# Patient Record
Sex: FEMALE | Race: WHITE | NOT HISPANIC OR LATINO | Employment: OTHER | URBAN - METROPOLITAN AREA
[De-identification: names, ages, dates, MRNs, and addresses within clinical notes are randomized per-mention and may not be internally consistent; named-entity substitution may affect disease eponyms.]

---

## 2017-05-07 ENCOUNTER — HOSPITAL ENCOUNTER (EMERGENCY)
Facility: HOSPITAL | Age: 82
Discharge: HOME | End: 2017-05-07
Attending: EMERGENCY MEDICINE
Payer: MEDICARE

## 2017-05-07 VITALS
WEIGHT: 144 LBS | HEIGHT: 60 IN | HEART RATE: 66 BPM | OXYGEN SATURATION: 95 % | TEMPERATURE: 98 F | BODY MASS INDEX: 28.27 KG/M2 | DIASTOLIC BLOOD PRESSURE: 58 MMHG | RESPIRATION RATE: 15 BRPM | SYSTOLIC BLOOD PRESSURE: 135 MMHG

## 2017-05-07 DIAGNOSIS — R10.9 ABDOMINAL PAIN: ICD-10-CM

## 2017-05-07 LAB
ALBUMIN SERPL BCP-MCNC: 3.8 G/DL
ALP SERPL-CCNC: 95 U/L
ALT SERPL W/O P-5'-P-CCNC: 18 U/L
ANION GAP SERPL CALC-SCNC: 12 MMOL/L
AST SERPL-CCNC: 20 U/L
BASOPHILS # BLD AUTO: 0.01 K/UL
BASOPHILS NFR BLD: 0.3 %
BILIRUB SERPL-MCNC: 0.3 MG/DL
BILIRUB UR QL STRIP: NEGATIVE
BUN SERPL-MCNC: 34 MG/DL
CALCIUM SERPL-MCNC: 9.8 MG/DL
CHLORIDE SERPL-SCNC: 106 MMOL/L
CLARITY UR: CLEAR
CO2 SERPL-SCNC: 22 MMOL/L
COLOR UR: YELLOW
CREAT SERPL-MCNC: 1.7 MG/DL
DIFFERENTIAL METHOD: ABNORMAL
EOSINOPHIL # BLD AUTO: 0 K/UL
EOSINOPHIL NFR BLD: 1.1 %
ERYTHROCYTE [DISTWIDTH] IN BLOOD BY AUTOMATED COUNT: 13.5 %
EST. GFR  (AFRICAN AMERICAN): 33 ML/MIN/1.73 M^2
EST. GFR  (NON AFRICAN AMERICAN): 28 ML/MIN/1.73 M^2
GLUCOSE SERPL-MCNC: 116 MG/DL
GLUCOSE UR QL STRIP: NEGATIVE
HCT VFR BLD AUTO: 33.7 %
HGB BLD-MCNC: 11.1 G/DL
HGB UR QL STRIP: NEGATIVE
KETONES UR QL STRIP: NEGATIVE
LEUKOCYTE ESTERASE UR QL STRIP: NEGATIVE
LYMPHOCYTES # BLD AUTO: 0.9 K/UL
LYMPHOCYTES NFR BLD: 25.4 %
MCH RBC QN AUTO: 28.4 PG
MCHC RBC AUTO-ENTMCNC: 32.9 %
MCV RBC AUTO: 86 FL
MONOCYTES # BLD AUTO: 0.3 K/UL
MONOCYTES NFR BLD: 7.9 %
NEUTROPHILS # BLD AUTO: 2.4 K/UL
NEUTROPHILS NFR BLD: 65.3 %
NITRITE UR QL STRIP: NEGATIVE
PH UR STRIP: 5 [PH] (ref 5–8)
PLATELET # BLD AUTO: 251 K/UL
PMV BLD AUTO: 9.9 FL
POTASSIUM SERPL-SCNC: 5.3 MMOL/L
PROT SERPL-MCNC: 7.1 G/DL
PROT UR QL STRIP: NEGATIVE
RBC # BLD AUTO: 3.91 M/UL
SODIUM SERPL-SCNC: 140 MMOL/L
SP GR UR STRIP: 1.02 (ref 1–1.03)
TROPONIN I SERPL DL<=0.01 NG/ML-MCNC: 0.01 NG/ML
URN SPEC COLLECT METH UR: NORMAL
UROBILINOGEN UR STRIP-ACNC: NEGATIVE EU/DL
WBC # BLD AUTO: 3.66 K/UL

## 2017-05-07 PROCEDURE — 84484 ASSAY OF TROPONIN QUANT: CPT

## 2017-05-07 PROCEDURE — 81003 URINALYSIS AUTO W/O SCOPE: CPT

## 2017-05-07 PROCEDURE — 96361 HYDRATE IV INFUSION ADD-ON: CPT

## 2017-05-07 PROCEDURE — 93005 ELECTROCARDIOGRAM TRACING: CPT

## 2017-05-07 PROCEDURE — 96375 TX/PRO/DX INJ NEW DRUG ADDON: CPT

## 2017-05-07 PROCEDURE — 85025 COMPLETE CBC W/AUTO DIFF WBC: CPT

## 2017-05-07 PROCEDURE — 93010 ELECTROCARDIOGRAM REPORT: CPT | Mod: ,,, | Performed by: INTERNAL MEDICINE

## 2017-05-07 PROCEDURE — 25000003 PHARM REV CODE 250: Performed by: EMERGENCY MEDICINE

## 2017-05-07 PROCEDURE — 80053 COMPREHEN METABOLIC PANEL: CPT

## 2017-05-07 PROCEDURE — 96374 THER/PROPH/DIAG INJ IV PUSH: CPT

## 2017-05-07 PROCEDURE — 63600175 PHARM REV CODE 636 W HCPCS: Performed by: EMERGENCY MEDICINE

## 2017-05-07 PROCEDURE — 99284 EMERGENCY DEPT VISIT MOD MDM: CPT | Mod: 25

## 2017-05-07 RX ORDER — MORPHINE SULFATE 2 MG/ML
2 INJECTION, SOLUTION INTRAMUSCULAR; INTRAVENOUS
Status: COMPLETED | OUTPATIENT
Start: 2017-05-07 | End: 2017-05-07

## 2017-05-07 RX ORDER — ONDANSETRON 2 MG/ML
4 INJECTION INTRAMUSCULAR; INTRAVENOUS ONCE
Status: COMPLETED | OUTPATIENT
Start: 2017-05-07 | End: 2017-05-07

## 2017-05-07 RX ORDER — HYDROCODONE BITARTRATE AND ACETAMINOPHEN 5; 325 MG/1; MG/1
1 TABLET ORAL EVERY 4 HOURS PRN
Qty: 12 TABLET | Refills: 0 | Status: SHIPPED | OUTPATIENT
Start: 2017-05-07 | End: 2017-05-17

## 2017-05-07 RX ORDER — ONDANSETRON 4 MG/1
4 TABLET, FILM COATED ORAL EVERY 8 HOURS PRN
Qty: 12 TABLET | Refills: 0 | Status: SHIPPED | OUTPATIENT
Start: 2017-05-07 | End: 2019-08-05

## 2017-05-07 RX ORDER — SODIUM CHLORIDE 9 MG/ML
INJECTION, SOLUTION INTRAVENOUS CONTINUOUS
Status: DISCONTINUED | OUTPATIENT
Start: 2017-05-07 | End: 2017-05-07 | Stop reason: HOSPADM

## 2017-05-07 RX ADMIN — SODIUM CHLORIDE: 0.9 INJECTION, SOLUTION INTRAVENOUS at 11:05

## 2017-05-07 RX ADMIN — ONDANSETRON 4 MG: 2 INJECTION INTRAMUSCULAR; INTRAVENOUS at 11:05

## 2017-05-07 RX ADMIN — MORPHINE SULFATE 2 MG: 2 INJECTION, SOLUTION INTRAMUSCULAR; INTRAVENOUS at 11:05

## 2017-05-07 NOTE — ED AVS SNAPSHOT
[facility] [address] [place] [address]               Lali Floyd   2017 10:40 AM   ED    Description:  Female : 1935   Department:  [facility]           Your Care was Coordinated By:     Provider Role From To    Jessie Correa MD Attending Provider 17 1052 --      Reason for Visit     Flank Pain           Diagnoses this Visit        Comments    Abdominal pain           ED Disposition     ED Disposition Condition Comment    Discharge             To Do List           Follow-up Information     Follow up with Blayne Santos MD In 2 days.    Specialty:  Internal Medicine    Contact information:    [address] [facility] [id number] [phone]          Follow up with [facility].    Specialty:  Emergency Medicine    Why:  As needed, If symptoms worsen    Contact information:    [address] [place] [id number] [phone]       These Medications        Disp Refills Start End    hydrocodone-acetaminophen 5-325mg (NORCO) 5-325 mg per tablet 12 tablet 0 2017    Take 1 tablet by mouth every 4 (four) hours as needed for Pain. - Oral    ondansetron (ZOFRAN) 4 MG tablet 12 tablet 0 2017     Take 1 tablet (4 mg total) by mouth every 8 (eight) hours as needed for Nausea. - Oral      [facility]     [facility] Care [facility] - 24/ Assistance  Unless otherwise directed by your provider, please contact [facility], our nurse care line that is available for / assistance.     Registered nurses in the [facility] provide: appointment scheduling, clinical advisement, health education, and other advisory services.  Call: [phone] (toll free)               Medications           Message regarding Medications     Verify the changes and/or additions to your medication regime listed below are the same as discussed with your clinician  today.  If any of these changes or additions are incorrect, please notify your healthcare provider.        START taking these NEW medications        Refills    hydrocodone-acetaminophen 5-325mg (NORCO) 5-325 mg per tablet 0    Sig: Take 1 tablet by mouth every 4 (four) hours as needed for Pain.    Class: Print    Route: Oral    ondansetron (ZOFRAN) 4 MG tablet 0    Sig: Take 1 tablet (4 mg total) by mouth every 8 (eight) hours as needed for Nausea.    Class: Print    Route: Oral      These medications were administered today        Dose Freq    0.9%  NaCl infusion  Continuous    Sig: Inject into the vein continuous.    Class: Normal    Route: Intravenous    morphine injection 2 mg 2 mg ED 1 Time    Sig: Inject 1 mL (2 mg total) into the vein ED 1 Time.    Class: Normal    Route: Intravenous    ondansetron injection 4 mg 4 mg Once    Sig: Inject 4 mg into the vein once.    Class: Normal    Route: Intravenous           Verify that the below list of medications is an accurate representation of the medications you are currently taking.  If none reported, the list may be blank. If incorrect, please contact your healthcare provider. Carry this list with you in case of emergency.           Current Medications     0.9%  NaCl infusion Inject into the vein continuous.    hydrocodone-acetaminophen 5-325mg (NORCO) 5-325 mg per tablet Take 1 tablet by mouth every 4 (four) hours as needed for Pain.    ondansetron (ZOFRAN) 4 MG tablet Take 1 tablet (4 mg total) by mouth every 8 (eight) hours as needed for Nausea.           Clinical Reference Information           Your Vitals Were     BP Pulse Temp Resp Height Weight    125/50 64 97.8 °F (36.6 °C) (Oral) 14 5' (1.524 m) 65.3 kg (144 lb)    SpO2 BMI             97% 28.12 kg/m2         Allergies as of 5/7/2017        Reactions    Codeine       Immunizations Administered on Date of Encounter - 5/7/2017     None      ED Micro, Lab, POCT     Start Ordered       Status Ordering Provider     05/07/17 1201 05/07/17 1200  Troponin I  Add-on      Completed     05/07/17 1104 05/07/17 1103  CBC auto differential  Once      Final result     05/07/17 1104 05/07/17 1103  Comprehensive metabolic panel  Once      Final result     05/07/17 1104 05/07/17 1103  Urinalysis  Once      Final result     05/07/17 1103 05/07/17 1103  Troponin I  Once      Final result       ED Imaging Orders     Start Ordered       Status Ordering Provider    05/07/17 1104 05/07/17 1103  CT Renal Stone Study ABD Pelvis WO  1 time imaging      Final result         Discharge Instructions         Abdominal Pain    Abdominal pain is pain in the stomach or belly area. Everyone has this pain from time to time. In many cases it goes away on its own. But abdominal pain can sometimes be due to a serious problem, such as appendicitis. So its important to know when to seek help.  Causes of abdominal pain  There are many possible causes of abdominal pain. Common causes in adults include:  · Constipation, diarrhea, or gas  · Stomach acid flowing back up into the esophagus (acid reflux or heartburn)  · Severe acid reflux, called GERD (gastroesophageal reflux disease)  · A sore in the lining of the stomach or small intestine (peptic ulcer)  · Inflammation of the gallbladder, liver, or pancreas  · Gallstones or kidney stones  · Appendicitis   · Intestinal blockage   · An internal organ pushing through a muscle or other tissue (hernia)  · Urinary tract infections  · In women, menstrual cramps, fibroids, or endometriosis  · Inflammation or infection of the intestines  Diagnosing the cause of abdominal pain  Your healthcare provider will do a physical exam help find the cause of your pain. If needed, tests will be ordered. Belly pain has many possible causes. So it can be hard to find the reason for your pain. Giving details about your pain can help. Tell your provider where and when you feel the pain, and what makes it better or worse. Also let your  provider know if you have other symptoms such as:  · Fever  · Tiredness  · Upset stomach (nausea)  · Vomiting  · Changes in bathroom habits  Treating abdominal pain  Some causes of pain need emergency medical treatment right away. These include appendicitis or a bowel blockage. Other problems can be treated with rest, fluids, or medicines. Your healthcare provider can give you specific instructions for treatment or self-care based on what is causing your pain.  If you have vomiting or diarrhea, sip water or other clear fluids. When you are ready to eat solid foods again, start with small amounts of easy-to-digest, low-fat foods. These include apple sauce, toast, or crackers.   When to seek medical care  Call 911 or go to the hospital right away if you:  · Cant pass stool and are vomiting  · Are vomiting blood or have bloody diarrhea or black, tarry diarrhea  · Have chest, neck, or shoulder pain  · Feel like you might pass out  · Have pain in your shoulder blades with nausea  · Have sudden, severe belly pain  · Have new, severe pain unlike any you have felt before  · Have a belly that is rigid, hard, and tender to touch  Call your healthcare provider if you have:  · Pain for more than 5 days  · Bloating for more than 2 days  · Diarrhea for more than 5 days  · A fever of 100.4°F (38.0°C) or higher, or as directed by your provider  · Pain that gets worse  · Weight loss for no reason  · Continued lack of appetite  · Blood in your stool  How to prevent abdominal pain  Here are some tips to help prevent abdominal pain:  · Eat smaller amounts of food at one time.  · Avoid greasy, fried, or other high-fat foods.  · Avoid foods that give you gas.  · Exercise regularly.  · Drink plenty of fluids.  To help prevent GERD symptoms:  · Quit smoking.  · Reduce alcohol and certain foods that increase stomach acid.  · Avoid aspirin and over-the-counter pain and fever medicines (NSAIDS or nonsteroidal anti-inflammatory drugs), if  possible  · Lose extra weight.  · Finish eating at least 2 hours before you go to bed or lie down.  · Raise the head of your bed.  Date Last Reviewed: 7/1/2016  © 8482-7073 [employer]. [address] [place] [address], [place] [address]. All rights reserved. This information is not intended as a substitute for professional medical care. Always follow your healthcare professional's instructions.          [facility] Sign-Up     Activating your [facility] account is as easy as 1-2-3!     1) Visit [email or web address], select Sign Up Now, enter this activation code and your date of birth, then select Next.  [email or web address]  Expires: 6/21/2017  1:09 PM      2) Create a username and password to use when you visit [facility] in the future and select a security question in case you lose your password and select Next.    3) Enter your e-mail address and click Sign Up!    Additional Information  If you have questions, please e-mail [email or web address] or call [phone] to talk to our [facility] staff. Remember, [facility] is NOT to be used for urgent needs. For medical emergencies, dial 911.         Smoking Cessation     If you would like to quit smoking:   You may be eligible for free services if you are a [place] resident and started smoking cigarettes before September 1, 1988.  Call the Smoking Cessation [employer] ([facility]) toll free at [phone] [phone] or [phone] 476-5022.   Call -101-QUIT-NOW if you do not meet the above criteria.   Contact us via email: [email or web address]   View our website for more information: [email or web address]         [facility] complies with applicable Federal civil rights laws and does not discriminate on the basis of race, color, national origin, age, disability, or sex.        Language Assistance Services     ATTENTION: Language assistance services are available, free of charge. Please call [phone].      ATENCIÓN: Si lexiila español, tiene a carnes disposición  servicios gratuitos de asistencia lingüística. Jono al [phone].     CECI Ý: N?u b?n nói Ti?ng Vi?t, có các d?ch v? h? tr? ngôn ng? mi?n phí dành cho b?n. G?i s? [phone].

## 2017-05-07 NOTE — DISCHARGE INSTRUCTIONS
Abdominal Pain    Abdominal pain is pain in the stomach or belly area. Everyone has this pain from time to time. In many cases it goes away on its own. But abdominal pain can sometimes be due to a serious problem, such as appendicitis. So its important to know when to seek help.  Causes of abdominal pain  There are many possible causes of abdominal pain. Common causes in adults include:  · Constipation, diarrhea, or gas  · Stomach acid flowing back up into the esophagus (acid reflux or heartburn)  · Severe acid reflux, called GERD (gastroesophageal reflux disease)  · A sore in the lining of the stomach or small intestine (peptic ulcer)  · Inflammation of the gallbladder, liver, or pancreas  · Gallstones or kidney stones  · Appendicitis   · Intestinal blockage   · An internal organ pushing through a muscle or other tissue (hernia)  · Urinary tract infections  · In women, menstrual cramps, fibroids, or endometriosis  · Inflammation or infection of the intestines  Diagnosing the cause of abdominal pain  Your healthcare provider will do a physical exam help find the cause of your pain. If needed, tests will be ordered. Belly pain has many possible causes. So it can be hard to find the reason for your pain. Giving details about your pain can help. Tell your provider where and when you feel the pain, and what makes it better or worse. Also let your provider know if you have other symptoms such as:  · Fever  · Tiredness  · Upset stomach (nausea)  · Vomiting  · Changes in bathroom habits  Treating abdominal pain  Some causes of pain need emergency medical treatment right away. These include appendicitis or a bowel blockage. Other problems can be treated with rest, fluids, or medicines. Your healthcare provider can give you specific instructions for treatment or self-care based on what is causing your pain.  If you have vomiting or diarrhea, sip water or other clear fluids. When you are ready to eat solid foods again,  start with small amounts of easy-to-digest, low-fat foods. These include apple sauce, toast, or crackers.   When to seek medical care  Call 911 or go to the hospital right away if you:  · Cant pass stool and are vomiting  · Are vomiting blood or have bloody diarrhea or black, tarry diarrhea  · Have chest, neck, or shoulder pain  · Feel like you might pass out  · Have pain in your shoulder blades with nausea  · Have sudden, severe belly pain  · Have new, severe pain unlike any you have felt before  · Have a belly that is rigid, hard, and tender to touch  Call your healthcare provider if you have:  · Pain for more than 5 days  · Bloating for more than 2 days  · Diarrhea for more than 5 days  · A fever of 100.4°F (38.0°C) or higher, or as directed by your provider  · Pain that gets worse  · Weight loss for no reason  · Continued lack of appetite  · Blood in your stool  How to prevent abdominal pain  Here are some tips to help prevent abdominal pain:  · Eat smaller amounts of food at one time.  · Avoid greasy, fried, or other high-fat foods.  · Avoid foods that give you gas.  · Exercise regularly.  · Drink plenty of fluids.  To help prevent GERD symptoms:  · Quit smoking.  · Reduce alcohol and certain foods that increase stomach acid.  · Avoid aspirin and over-the-counter pain and fever medicines (NSAIDS or nonsteroidal anti-inflammatory drugs), if possible  · Lose extra weight.  · Finish eating at least 2 hours before you go to bed or lie down.  · Raise the head of your bed.  Date Last Reviewed: 7/1/2016  © 5514-6664 [employer]. [address] [place] [address], [place] [address]. All rights reserved. This information is not intended as a substitute for professional medical care. Always follow your healthcare professional's instructions.

## 2017-05-07 NOTE — ED PROVIDER NOTES
SCRIBE #1 NOTE: I, Reagan Hughes, am scribing for, and in the presence of, Jessie Correa MD. I have scribed the entire note.      History      Chief Complaint   Patient presents with    Flank Pain     pt is having right flank        Review of patient's allergies indicates:   Allergen Reactions    Codeine         HPI   HPI    5/7/2017, 11:13 AM   History obtained from the patient      History of Present Illness: Lali Floyd is a 81 y.o. female patient who presents to the Emergency Department for R flank pain which onset gradually one week ago, suddenly worsening this AM. Symptoms have been constant this AM and moderate in severity. Sx are exacerbated by nothing and relieved with 600 mg of motrin. No other sxs reported. Pt states she may have strained her back lifting up on objects since she is moving a lot of furniture. Pt also reports slipping and falling in her shower one week ago. Patient denies any fever, N/V/D, chills, constipation, dysuria, difficulty urinating, hematuria, CP, SOB, radiating pain and all other sxs at this time. No further complaints or concerns at this time.     Arrival mode: Personal vehicle      PCP: Blayne Santos MD     Past Medical History:  Past medical history reviewed not relevant      Past Surgical History:  Past surgical history reviewed not relevant      Family History:  Family history reviewed not relevant      Social History:  Social History    Social History Main Topics    Social History Main Topics    Smoking status: Unknown if ever smoked    Smokeless tobacco: Unknown if ever used    Alcohol Use: Unknown drinking history    Drug Use: Unknown if ever used    Sexual Activity: Unknown         ROS   Review of Systems   Constitutional: Negative for chills and fever.   HENT: Negative for congestion and sore throat.    Respiratory: Negative for chest tightness and shortness of breath.    Cardiovascular: Negative for chest pain.   Gastrointestinal: Negative for abdominal  pain, nausea and vomiting.   Genitourinary: Positive for flank pain (R). Negative for decreased urine volume, difficulty urinating, dysuria, hematuria and urgency.   Musculoskeletal: Negative for back pain and neck pain.   Skin: Negative for rash.   Neurological: Negative for dizziness, numbness and headaches.   Psychiatric/Behavioral: Negative for agitation and confusion.   All other systems reviewed and are negative.      Physical Exam    Initial Vitals   BP Pulse Resp Temp SpO2   05/07/17 1046 05/07/17 1046 05/07/17 1046 05/07/17 1050 05/07/17 1046   147/67 71 18 97.8 °F (36.6 °C) 98 %      Physical Exam  Nursing Notes and Vital Signs Reviewed.  Constitutional: Patient is in no acute distress. Awake and alert. Well-developed and well-nourished.  Head: Atraumatic. Normocephalic.  Eyes: PERRL. EOM intact. Conjunctivae are not pale. No scleral icterus.  ENT: Mucous membranes are moist. Oropharynx is clear and symmetric.    Neck: Supple. Full ROM. No lymphadenopathy.  Cardiovascular: Regular rate. Regular rhythm. No murmurs, rubs, or gallops. Distal pulses are 2+ and symmetric.  Pulmonary/Chest: No respiratory distress. Clear to auscultation bilaterally. No wheezing, rales, or rhonchi.  Abdominal: Soft and non-distended.  There is RUQ tenderness.  No rebound, guarding, or rigidity. Good bowel sounds.  Genitourinary: R CVA tenderness  Musculoskeletal: Moves all extremities. No obvious deformities. No edema. No calf tenderness.  Skin: Warm and dry.  Neurological:  Alert, awake, and appropriate.  Normal speech.  No acute focal neurological deficits are appreciated.  Psychiatric: Normal affect. Good eye contact. Appropriate in content.    ED Course    Procedures  ED Vital Signs:  Vitals:    05/07/17 1046 05/07/17 1050 05/07/17 1115 05/07/17 1137   BP: (!) 147/67  (!) 140/61    Pulse: 71  64 65   Resp: 18  14 13   Temp:  97.8 °F (36.6 °C)     TempSrc:  Oral     SpO2: 98%  99% 99%   Weight: 65.3 kg (144 lb)      Height: 5'  (1.524 m)       05/07/17 1245 05/07/17 1315   BP: (!) 125/50 (!) 135/58   Pulse: 64 66   Resp: 14 15   Temp:     TempSrc:     SpO2: 97% 95%   Weight:     Height:         Abnormal Lab Results:  Labs Reviewed   CBC W/ AUTO DIFFERENTIAL - Abnormal; Notable for the following:        Result Value    WBC 3.66 (*)     RBC 3.91 (*)     Hemoglobin 11.1 (*)     Hematocrit 33.7 (*)     Lymph # 0.9 (*)     All other components within normal limits   COMPREHENSIVE METABOLIC PANEL - Abnormal; Notable for the following:     Potassium 5.3 (*)     CO2 22 (*)     Glucose 116 (*)     BUN, Bld 34 (*)     Creatinine 1.7 (*)     eGFR if  33 (*)     eGFR if non  28 (*)     All other components within normal limits   URINALYSIS   TROPONIN I   TROPONIN I        All Lab Results:  Results for orders placed or performed during the hospital encounter of 05/07/17   CBC auto differential   Result Value Ref Range    WBC 3.66 (L) 3.90 - 12.70 K/uL    RBC 3.91 (L) 4.00 - 5.40 M/uL    Hemoglobin 11.1 (L) 12.0 - 16.0 g/dL    Hematocrit 33.7 (L) 37.0 - 48.5 %    MCV 86 82 - 98 fL    MCH 28.4 27.0 - 31.0 pg    MCHC 32.9 32.0 - 36.0 %    RDW 13.5 11.5 - 14.5 %    Platelets 251 150 - 350 K/uL    MPV 9.9 9.2 - 12.9 fL    Gran # 2.4 1.8 - 7.7 K/uL    Lymph # 0.9 (L) 1.0 - 4.8 K/uL    Mono # 0.3 0.3 - 1.0 K/uL    Eos # 0.0 0.0 - 0.5 K/uL    Baso # 0.01 0.00 - 0.20 K/uL    Gran% 65.3 38.0 - 73.0 %    Lymph% 25.4 18.0 - 48.0 %    Mono% 7.9 4.0 - 15.0 %    Eosinophil% 1.1 0.0 - 8.0 %    Basophil% 0.3 0.0 - 1.9 %    Differential Method Automated    Comprehensive metabolic panel   Result Value Ref Range    Sodium 140 136 - 145 mmol/L    Potassium 5.3 (H) 3.5 - 5.1 mmol/L    Chloride 106 95 - 110 mmol/L    CO2 22 (L) 23 - 29 mmol/L    Glucose 116 (H) 70 - 110 mg/dL    BUN, Bld 34 (H) 8 - 23 mg/dL    Creatinine 1.7 (H) 0.5 - 1.4 mg/dL    Calcium 9.8 8.7 - 10.5 mg/dL    Total Protein 7.1 6.0 - 8.4 g/dL    Albumin 3.8 3.5 - 5.2 g/dL     Total Bilirubin 0.3 0.1 - 1.0 mg/dL    Alkaline Phosphatase 95 55 - 135 U/L    AST 20 10 - 40 U/L    ALT 18 10 - 44 U/L    Anion Gap 12 8 - 16 mmol/L    eGFR if African American 33 (A) >60 mL/min/1.73 m^2    eGFR if non African American 28 (A) >60 mL/min/1.73 m^2   Urinalysis   Result Value Ref Range    Specimen UA Urine, Clean Catch     Color, UA Yellow Yellow, Straw, Keke    Appearance, UA Clear Clear    pH, UA 5.0 5.0 - 8.0    Specific Gravity, UA 1.025 1.005 - 1.030    Protein, UA Negative Negative    Glucose, UA Negative Negative    Ketones, UA Negative Negative    Bilirubin (UA) Negative Negative    Occult Blood UA Negative Negative    Nitrite, UA Negative Negative    Urobilinogen, UA Negative <2.0 EU/dL    Leukocytes, UA Negative Negative   Troponin I   Result Value Ref Range    Troponin I 0.013 0.000 - 0.026 ng/mL         Imaging Results:  Imaging Results         CT Renal Stone Study ABD Pelvis WO (Final result) Result time:  05/07/17 11:50:14    Final result by Billie Carmona MD (05/07/17 11:50:14)    Impression:     There is a 0.68 cm nonobstructing stone in the lower pole of left kidney.        All CT scans at this facility use dose modulation, iterative reconstruction and/or weight based dosing when appropriate to reduce radiation dose to as low as reasonably achievable.       Electronically signed by: BILLIE CARMONA MD  Date:     05/07/17  Time:    11:50     Narrative:    CT RENAL STONE STUDY ABD PELVIS WO    Standard noncontrast CT scan of the abdomen and pelvis utilizing renal stone protocol.    History:  right flank pain     The liver, spleen, kidneys and pancreas appear grossly normal.  The gallbladder is adequately identified.  The visible bowel is grossly unremarkable.     The right kidney appears free of mass calculus or obstruction the right ureter is normal in appearance.  On the left, there is a nonobstructing stone in the lower pole which measures 0.68 cm.  A renal cyst in the upper pole  posteriorly on the left measures 1.1 cm.  The left ureter is normal in its course and configuration.  The bladder is decompressed.    Chronic degenerative change of the spine is present.               The EKG was ordered, reviewed, and independently interpreted by the ED provider.  Interpretation time: 1221  Rate: 63 BPM  Rhythm: normal sinus rhythm  Interpretation: Cannot rule out septal infarct. No STEMI.           The Emergency Provider reviewed the vital signs and test results, which are outlined above.    ED Discussion     1:02 PM: Reassessed pt at this time.  Pt states her condition has improved at this time and she is feeling better. Pt is laying comfortably in ED bed and in NAD. Pt is awake, alert, and oriented. Discussed with pt all pertinent ED information and results. Discussed pt dx and plan of tx. Gave pt all f/u and return to the ED instructions. All questions and concerns were addressed at this time. Pt expresses understanding of information and instructions, and is comfortable with plan to discharge. Pt is stable for discharge.    I discussed with patient and/or family/caretaker that evaluation in the ED does not suggest any emergent or life threatening medical conditions requiring immediate intervention beyond what was provided in the ED, and I believe patient is safe for discharge.  Regardless, an unremarkable evaluation in the ED does not preclude the development or presence of a serious of life threatening condition. As such, patient was instructed to return immediately for any worsening or change in current symptoms.    ED Medication(s):  Medications   0.9%  NaCl infusion ( Intravenous Stopped 5/7/17 1337)   morphine injection 2 mg (2 mg Intravenous Given 5/7/17 1130)   ondansetron injection 4 mg (4 mg Intravenous Given 5/7/17 1130)       Discharge Medication List as of 5/7/2017  1:09 PM      START taking these medications    Details   hydrocodone-acetaminophen 5-325mg (NORCO) 5-325 mg per  tablet Take 1 tablet by mouth every 4 (four) hours as needed for Pain., Starting 5/7/2017, Until Wed 5/17/17, Print      ondansetron (ZOFRAN) 4 MG tablet Take 1 tablet (4 mg total) by mouth every 8 (eight) hours as needed for Nausea., Starting 5/7/2017, Until Discontinued, Print             Follow-up Information     Follow up with Blayne Santos MD In 2 days.    Specialty:  Internal Medicine    Contact information:    [address] [facility] [address] [phone]          Follow up with [facility] - .    Specialty:  Emergency Medicine    Why:  As needed, If symptoms worsen    Contact information:    [address] [facility] [address] [phone]            Medical Decision Making    Medical Decision Making:   Clinical Tests:   Lab Tests: Reviewed and Ordered  Radiological Study: Ordered and Reviewed           Scribe Attestation:   Scribe #1: I performed the above scribed service and the documentation accurately describes the services I performed. I attest to the accuracy of the note.    Attending:   Physician Attestation Statement for Scribe #1: I, Jessie Correa MD, personally performed the services described in this documentation, as scribed by Reagan Hughes, in my presence, and it is both accurate and complete.          Clinical Impression       ICD-10-CM ICD-9-CM   1. Abdominal pain R10.9 789.00       Disposition:   Disposition: Discharged  Condition: Stable         Jessie Correa MD  05/07/17 3546

## 2019-08-04 PROCEDURE — 96375 TX/PRO/DX INJ NEW DRUG ADDON: CPT

## 2019-08-04 PROCEDURE — 96374 THER/PROPH/DIAG INJ IV PUSH: CPT

## 2019-08-04 PROCEDURE — 99285 EMERGENCY DEPT VISIT HI MDM: CPT | Mod: 25

## 2019-08-05 ENCOUNTER — HOSPITAL ENCOUNTER (EMERGENCY)
Facility: HOSPITAL | Age: 84
Discharge: HOME | End: 2019-08-05
Attending: EMERGENCY MEDICINE
Payer: MEDICARE

## 2019-08-05 VITALS
TEMPERATURE: 98 F | RESPIRATION RATE: 16 BRPM | OXYGEN SATURATION: 95 % | SYSTOLIC BLOOD PRESSURE: 145 MMHG | HEIGHT: 60 IN | HEART RATE: 69 BPM | WEIGHT: 142.88 LBS | DIASTOLIC BLOOD PRESSURE: 75 MMHG | BODY MASS INDEX: 28.05 KG/M2

## 2019-08-05 DIAGNOSIS — M79.10 MUSCULAR ACHES: Primary | ICD-10-CM

## 2019-08-05 DIAGNOSIS — R07.9 CHEST PAIN: ICD-10-CM

## 2019-08-05 DIAGNOSIS — M25.519 SHOULDER PAIN: ICD-10-CM

## 2019-08-05 LAB
ALBUMIN SERPL BCP-MCNC: 3.6 G/DL (ref 3.5–5.2)
ALP SERPL-CCNC: 113 U/L (ref 55–135)
ALT SERPL W/O P-5'-P-CCNC: 22 U/L (ref 10–44)
ANION GAP SERPL CALC-SCNC: 11 MMOL/L (ref 8–16)
AST SERPL-CCNC: 19 U/L (ref 10–40)
BASOPHILS # BLD AUTO: 0.01 K/UL (ref 0–0.2)
BASOPHILS NFR BLD: 0.2 % (ref 0–1.9)
BILIRUB SERPL-MCNC: 0.4 MG/DL (ref 0.1–1)
BUN SERPL-MCNC: 43 MG/DL (ref 8–23)
CALCIUM SERPL-MCNC: 9.6 MG/DL (ref 8.7–10.5)
CHLORIDE SERPL-SCNC: 105 MMOL/L (ref 95–110)
CO2 SERPL-SCNC: 25 MMOL/L (ref 23–29)
CREAT SERPL-MCNC: 1.9 MG/DL (ref 0.5–1.4)
D DIMER PPP IA.FEU-MCNC: 0.26 MG/L FEU
DIFFERENTIAL METHOD: ABNORMAL
EOSINOPHIL # BLD AUTO: 0.1 K/UL (ref 0–0.5)
EOSINOPHIL NFR BLD: 3 % (ref 0–8)
ERYTHROCYTE [DISTWIDTH] IN BLOOD BY AUTOMATED COUNT: 13.8 % (ref 11.5–14.5)
EST. GFR  (AFRICAN AMERICAN): 28 ML/MIN/1.73 M^2
EST. GFR  (NON AFRICAN AMERICAN): 24 ML/MIN/1.73 M^2
GLUCOSE SERPL-MCNC: 158 MG/DL (ref 70–110)
HCT VFR BLD AUTO: 32.2 % (ref 37–48.5)
HGB BLD-MCNC: 10.8 G/DL (ref 12–16)
LYMPHOCYTES # BLD AUTO: 1.5 K/UL (ref 1–4.8)
LYMPHOCYTES NFR BLD: 33.9 % (ref 18–48)
MCH RBC QN AUTO: 28.3 PG (ref 27–31)
MCHC RBC AUTO-ENTMCNC: 33.5 G/DL (ref 32–36)
MCV RBC AUTO: 84 FL (ref 82–98)
MONOCYTES # BLD AUTO: 0.4 K/UL (ref 0.3–1)
MONOCYTES NFR BLD: 9.4 % (ref 4–15)
NEUTROPHILS # BLD AUTO: 2.3 K/UL (ref 1.8–7.7)
NEUTROPHILS NFR BLD: 53.5 % (ref 38–73)
PLATELET # BLD AUTO: 227 K/UL (ref 150–350)
PMV BLD AUTO: 9.9 FL (ref 9.2–12.9)
POTASSIUM SERPL-SCNC: 3.8 MMOL/L (ref 3.5–5.1)
PROT SERPL-MCNC: 6.6 G/DL (ref 6–8.4)
RBC # BLD AUTO: 3.82 M/UL (ref 4–5.4)
SODIUM SERPL-SCNC: 141 MMOL/L (ref 136–145)
TROPONIN I SERPL DL<=0.01 NG/ML-MCNC: 0.01 NG/ML (ref 0–0.03)
TROPONIN I SERPL DL<=0.01 NG/ML-MCNC: <0.006 NG/ML (ref 0–0.03)
WBC # BLD AUTO: 4.36 K/UL (ref 3.9–12.7)

## 2019-08-05 PROCEDURE — 85379 FIBRIN DEGRADATION QUANT: CPT

## 2019-08-05 PROCEDURE — 63600175 PHARM REV CODE 636 W HCPCS: Performed by: EMERGENCY MEDICINE

## 2019-08-05 PROCEDURE — 93010 ELECTROCARDIOGRAM REPORT: CPT | Mod: ,,, | Performed by: INTERNAL MEDICINE

## 2019-08-05 PROCEDURE — 93005 ELECTROCARDIOGRAM TRACING: CPT

## 2019-08-05 PROCEDURE — 93010 ELECTROCARDIOGRAM REPORT: CPT | Mod: 76,,, | Performed by: INTERNAL MEDICINE

## 2019-08-05 PROCEDURE — 25000003 PHARM REV CODE 250: Performed by: FAMILY MEDICINE

## 2019-08-05 PROCEDURE — 80053 COMPREHEN METABOLIC PANEL: CPT

## 2019-08-05 PROCEDURE — 93010 EKG 12-LEAD: ICD-10-PCS | Mod: 76,,, | Performed by: INTERNAL MEDICINE

## 2019-08-05 PROCEDURE — 63600175 PHARM REV CODE 636 W HCPCS: Performed by: FAMILY MEDICINE

## 2019-08-05 PROCEDURE — 85025 COMPLETE CBC W/AUTO DIFF WBC: CPT

## 2019-08-05 PROCEDURE — 84484 ASSAY OF TROPONIN QUANT: CPT

## 2019-08-05 PROCEDURE — 84484 ASSAY OF TROPONIN QUANT: CPT | Mod: 91

## 2019-08-05 RX ORDER — ATORVASTATIN CALCIUM 40 MG/1
40 TABLET, FILM COATED ORAL
COMMUNITY
Start: 2019-04-18 | End: 2019-10-15

## 2019-08-05 RX ORDER — VALSARTAN 160 MG/1
TABLET ORAL
COMMUNITY
Start: 2019-06-03

## 2019-08-05 RX ORDER — ONDANSETRON 2 MG/ML
4 INJECTION INTRAMUSCULAR; INTRAVENOUS
Status: COMPLETED | OUTPATIENT
Start: 2019-08-05 | End: 2019-08-05

## 2019-08-05 RX ORDER — BACLOFEN 10 MG/1
10 TABLET ORAL 4 TIMES DAILY
Qty: 20 TABLET | Refills: 0 | Status: SHIPPED | OUTPATIENT
Start: 2019-08-05 | End: 2019-08-10

## 2019-08-05 RX ORDER — MAGNESIUM 200 MG
TABLET ORAL
COMMUNITY
Start: 2017-11-10

## 2019-08-05 RX ORDER — CHLORTHALIDONE 25 MG/1
25 TABLET ORAL
COMMUNITY
Start: 2019-04-18

## 2019-08-05 RX ORDER — KETOROLAC TROMETHAMINE 30 MG/ML
30 INJECTION, SOLUTION INTRAMUSCULAR; INTRAVENOUS
Status: COMPLETED | OUTPATIENT
Start: 2019-08-05 | End: 2019-08-05

## 2019-08-05 RX ORDER — NITROGLYCERIN 0.4 MG/1
0.4 TABLET SUBLINGUAL
COMMUNITY

## 2019-08-05 RX ORDER — ASPIRIN 325 MG
325 TABLET ORAL
Status: COMPLETED | OUTPATIENT
Start: 2019-08-05 | End: 2019-08-05

## 2019-08-05 RX ORDER — LEVOTHYROXINE SODIUM 50 UG/1
TABLET ORAL
COMMUNITY
Start: 2019-06-17

## 2019-08-05 RX ORDER — BUPROPION HYDROCHLORIDE 150 MG/1
150 TABLET ORAL
COMMUNITY
Start: 2019-07-17

## 2019-08-05 RX ORDER — CLOPIDOGREL BISULFATE 75 MG/1
75 TABLET ORAL
COMMUNITY
Start: 2019-04-18

## 2019-08-05 RX ORDER — KETOROLAC TROMETHAMINE 30 MG/ML
30 INJECTION, SOLUTION INTRAMUSCULAR; INTRAVENOUS
Status: DISCONTINUED | OUTPATIENT
Start: 2019-08-05 | End: 2019-08-05

## 2019-08-05 RX ORDER — CHOLECALCIFEROL (VITAMIN D3) 25 MCG
5000 TABLET ORAL
COMMUNITY

## 2019-08-05 RX ORDER — MORPHINE SULFATE 4 MG/ML
4 INJECTION, SOLUTION INTRAMUSCULAR; INTRAVENOUS
Status: COMPLETED | OUTPATIENT
Start: 2019-08-05 | End: 2019-08-05

## 2019-08-05 RX ORDER — TIZANIDINE 4 MG/1
4 TABLET ORAL
COMMUNITY
Start: 2019-07-17

## 2019-08-05 RX ADMIN — MORPHINE SULFATE 4 MG: 4 INJECTION INTRAVENOUS at 01:08

## 2019-08-05 RX ADMIN — ASPIRIN 325 MG ORAL TABLET 325 MG: 325 PILL ORAL at 03:08

## 2019-08-05 RX ADMIN — ONDANSETRON 4 MG: 2 INJECTION INTRAMUSCULAR; INTRAVENOUS at 01:08

## 2019-08-05 RX ADMIN — NITROGLYCERIN 1 INCH: 20 OINTMENT TOPICAL at 03:08

## 2019-08-05 RX ADMIN — KETOROLAC TROMETHAMINE 30 MG: 30 INJECTION, SOLUTION INTRAMUSCULAR at 04:08

## 2019-08-05 NOTE — ED PROVIDER NOTES
"SCRIBE #1 NOTE: I, Thor Bradshaw, am scribing for, and in the presence of, John Mercdao Jr., MD. I have scribed the HPI, ROS, PEX.     SCRIBE #2 NOTE: I, Rojelio Rivera, am scribing for, and in the presence of,  Misa Garcia MD. I have scribed the remaining portions of the note not scribed by Scribe #1.      History     Chief Complaint   Patient presents with    Shoulder Pain     pain under right shoulder blade x 1 wk.      Review of patient's allergies indicates:   Allergen Reactions    Codeine      "stomach cramps"         History of Present Illness     HPI    8/5/2019, 1:05 AM  History obtained from the daughter and patient      History of Present Illness: Lali Floyd is a 83 y.o. female patient who presents to the Emergency Department for evaluation of right shoulder pain. Pain is chronic, but worse today. Symptoms are constant and moderate in severity. Pain is exacerbated with deep breaths. Associated sxs include light-headedness. Patient denies any CP, SOB, N/V, abd pain, fever, chills, and all other sxs at this time. No prior Tx reported. No further complaints or concerns at this time.  Patient has had chronic posterior shoulder pain. Pain is worse with deep inspiration however today.  She denies any chest pain. No palpitations or shortness of breath. She denies any immobilization.  No history of PE.        Arrival mode: Personal vehicle    PCP: Blayne Santos MD        Past Medical History:  Past Medical History:   Diagnosis Date    Arthritis     Cancer     Coronary artery disease     Depression     Diabetes mellitus     History of heart artery stent 2005    Hypertension     MI (myocardial infarction) 2005    Renal disorder     Thyroid disease        Past Surgical History:  Past Surgical History:   Procedure Laterality Date    APPENDECTOMY      CHOLECYSTECTOMY      HYSTERECTOMY           Family History:  History reviewed. No pertinent family history.    Social History:  Social " History     Tobacco Use    Smoking status: Never Smoker   Substance and Sexual Activity    Alcohol use: Not Currently    Drug use: Never    Sexual activity: Yes        Review of Systems     Review of Systems   Constitutional: Negative for fever.   HENT: Negative for sore throat.    Respiratory: Negative for shortness of breath.    Cardiovascular: Negative for chest pain.   Gastrointestinal: Negative for nausea and vomiting.   Genitourinary: Negative for dysuria.   Musculoskeletal: Positive for myalgias (right shoulder blade). Negative for back pain.   Skin: Negative for rash.   Neurological: Positive for light-headedness. Negative for weakness.   Hematological: Does not bruise/bleed easily.   All other systems reviewed and are negative.       Physical Exam     Initial Vitals [08/05/19 0008]   BP Pulse Resp Temp SpO2   (!) 107/52 83 20 97.4 °F (36.3 °C) 99 %      MAP       --          Physical Exam  Nursing Notes and Vital Signs Reviewed.  Constitutional: Patient is in no acute distress. Well-developed and well-nourished.  Head: Atraumatic. Normocephalic.  Eyes: PERRL. EOM intact. Conjunctivae are not pale. No scleral icterus.  ENT: Mucous membranes are moist. Oropharynx is clear and symmetric.    Neck: Supple. Full ROM. No lymphadenopathy.  Cardiovascular: Regular rate. Regular rhythm. No murmurs, rubs, or gallops. Distal pulses are 2+ and symmetric.  Pulmonary/Chest: No respiratory distress. Clear to auscultation bilaterally. No wheezing or rales.  Abdominal: Soft and non-distended.  There is no tenderness.  No rebound, guarding, or rigidity. Good bowel sounds. No pulsatile palpable abdominal mass.  Genitourinary: No CVA tenderness.  No suprapubic tenderness.  Musculoskeletal: Tenderness over inferior scapula and back. Moves all extremities. No obvious deformities. No edema. No calf tenderness..  There is no point C/T/L/S tenderness. Normal spinal curvature.  Skin: Warm and dry.  Neurological:  Alert, awake,  and appropriate.  Normal speech.  No acute focal neurological deficits are appreciated.  Psychiatric: Normal affect. Good eye contact. Appropriate in content.     ED Course   Procedures  ED Vital Signs:  Vitals:    08/05/19 0008 08/05/19 0315 08/05/19 0345 08/05/19 0400   BP: (!) 107/52 (!) 187/118 (!) 167/70 (!) 173/77   Pulse: 83 65 (!) 58 69   Resp: 20 (!) 30 17 18   Temp: 97.4 °F (36.3 °C)      TempSrc: Oral      SpO2: 99% 100% 99% 99%   Weight: 64.8 kg (142 lb 13.7 oz)      Height: 5' (1.524 m)       08/05/19 0430   BP: 131/60   Pulse: 70   Resp: 15   Temp:    TempSrc:    SpO2: 98%   Weight:    Height:        Abnormal Lab Results:  Labs Reviewed   COMPREHENSIVE METABOLIC PANEL - Abnormal; Notable for the following components:       Result Value    Glucose 158 (*)     BUN, Bld 43 (*)     Creatinine 1.9 (*)     eGFR if  28 (*)     eGFR if non  24 (*)     All other components within normal limits   CBC W/ AUTO DIFFERENTIAL - Abnormal; Notable for the following components:    RBC 3.82 (*)     Hemoglobin 10.8 (*)     Hematocrit 32.2 (*)     All other components within normal limits   D DIMER, QUANTITATIVE   TROPONIN I   TROPONIN I        All Lab Results:  Results for orders placed or performed during the hospital encounter of 08/05/19   D dimer, quantitative   Result Value Ref Range    D-Dimer 0.26 <0.50 mg/L FEU   Comprehensive metabolic panel   Result Value Ref Range    Sodium 141 136 - 145 mmol/L    Potassium 3.8 3.5 - 5.1 mmol/L    Chloride 105 95 - 110 mmol/L    CO2 25 23 - 29 mmol/L    Glucose 158 (H) 70 - 110 mg/dL    BUN, Bld 43 (H) 8 - 23 mg/dL    Creatinine 1.9 (H) 0.5 - 1.4 mg/dL    Calcium 9.6 8.7 - 10.5 mg/dL    Total Protein 6.6 6.0 - 8.4 g/dL    Albumin 3.6 3.5 - 5.2 g/dL    Total Bilirubin 0.4 0.1 - 1.0 mg/dL    Alkaline Phosphatase 113 55 - 135 U/L    AST 19 10 - 40 U/L    ALT 22 10 - 44 U/L    Anion Gap 11 8 - 16 mmol/L    eGFR if African American 28 (A) >60  mL/min/1.73 m^2    eGFR if non African American 24 (A) >60 mL/min/1.73 m^2   CBC auto differential   Result Value Ref Range    WBC 4.36 3.90 - 12.70 K/uL    RBC 3.82 (L) 4.00 - 5.40 M/uL    Hemoglobin 10.8 (L) 12.0 - 16.0 g/dL    Hematocrit 32.2 (L) 37.0 - 48.5 %    Mean Corpuscular Volume 84 82 - 98 fL    Mean Corpuscular Hemoglobin 28.3 27.0 - 31.0 pg    Mean Corpuscular Hemoglobin Conc 33.5 32.0 - 36.0 g/dL    RDW 13.8 11.5 - 14.5 %    Platelets 227 150 - 350 K/uL    MPV 9.9 9.2 - 12.9 fL    Gran # (ANC) 2.3 1.8 - 7.7 K/uL    Lymph # 1.5 1.0 - 4.8 K/uL    Mono # 0.4 0.3 - 1.0 K/uL    Eos # 0.1 0.0 - 0.5 K/uL    Baso # 0.01 0.00 - 0.20 K/uL    Gran% 53.5 38.0 - 73.0 %    Lymph% 33.9 18.0 - 48.0 %    Mono% 9.4 4.0 - 15.0 %    Eosinophil% 3.0 0.0 - 8.0 %    Basophil% 0.2 0.0 - 1.9 %    Differential Method Automated    Troponin I   Result Value Ref Range    Troponin I 0.010 0.000 - 0.026 ng/mL   Troponin I   Result Value Ref Range    Troponin I <0.006 0.000 - 0.026 ng/mL         Imaging Results:  Imaging Results          X-Ray Chest AP Portable (In process)                4:53 AM: Per ED physician, pt's CXR results: no acute findings.      The EKG was ordered, reviewed, and independently interpreted by the ED provider.  Interpretation time: 0014  Rate: 80 BPM  Rhythm: normal sinus rhythm  Interpretation: Anterior infarct. No STEMI.    The EKG was ordered, reviewed, and independently interpreted by the ED provider.  Interpretation time: 0300  Rate: 57 BPM  Rhythm: sinus bradycardia  Interpretation: Low voltage QRS. Septal infarct. No STEMI.             The Emergency Provider reviewed the vital signs and test results, which are outlined above.     ED Discussion     1:54 AM: Dr. Mercado transfers care of pt to Dr. Garcia pending imaging results.         3:25 AM: Pt reports that her CP has worsened with morphine. Will administer toradol     3:35 AM: Re-evaluated pt. Pt is in no acute distress.  Pt states that she is still  "having CP and reports no relief from ED NTG. Will administer GI cocktail at this time. D/w pt all pertinent results. D/w pt any concerns expressed at this time. Answered all questions. Pt expresses understanding at this time.    3:44 AM: Pt reports that she is "unsure if the GI cocktail coated her stomach." Pt c/o right back pain. Will administer ED ASA at this time.    4:02 AM: Pt reports that her CP has improved but reports no relief to her back pain from ASA.    4:52 AM: Reassessed pt at this time. Pt states that her CP has resolved at this time. Pt c/o back pain. On physical examination of posterior aspect of right paraspinal muscles, muscle tension appreciated in T9-T11 region. Myofascial release attempted. Pt states that she had a similar episode of back pain x2 years for which she went to PT w/ relief. Pt reports back pain flared up again. Discussed creatinine function and need for repeat test. Discussed with pt all other pertinent ED information and results. Discussed pt dx and plan of tx. Gave pt all f/u and return to the ED instructions. All questions and concerns were addressed at this time. Pt expresses understanding of information and instructions, and is comfortable with plan to discharge. Pt is stable for discharge.    I discussed with patient and/or family/caretaker that evaluation in the ED does not suggest any emergent or life threatening medical conditions requiring immediate intervention beyond what was provided in the ED, and I believe patient is safe for discharge.  Regardless, an unremarkable evaluation in the ED does not preclude the development or presence of a serious of life threatening condition. As such, patient was instructed to return immediately for any worsening or change in current symptoms.    Driving or other activities under influence of medications - Patient and/or family/caretaker was given a prescription for, or instructed to use a medicine that may impair ability to drive, " operate machinery, or participate in other potentially dangerous activities.  Patient was instructed not to participate in these activities while under the influence of these medications.    I have discussed with patient and/or family/caretaker chest pain precautions, specifically to return for worsening chest pain, shortness of breath, fever, or any concern.  I have low suspicion for cardiopulmonary, vascular, infectious, respiratory, or other emergent medical condition based on my evaluation in the ED.    Please follow up with her primary care physician.  At this time you have musculoskeletal pain.  I recommend that you ice the region and have it massaged out.  Take baclofen as needed for spasms or pain however be aware that it is sedating in I recommend that you do not drive or operate heavy machinery on it.  I also recommend that you have for outpatient physical therapy.  If symptoms worsen or you have chest pain shortness of breath please come back to emergency department without hesitation.      ED Medication(s):  Medications   ondansetron injection 4 mg (4 mg Intravenous Given 8/5/19 0155)   morphine injection 4 mg (4 mg Intravenous Given 8/5/19 0155)   nitroGLYCERIN 2% TD oint ointment 1 inch (1 inch Topical (Top) Given 8/5/19 0305)   aspirin tablet 325 mg (325 mg Oral Given 8/5/19 0329)   ketorolac injection 30 mg (30 mg Intravenous Given 8/5/19 0415)       New Prescriptions    BACLOFEN (LIORESAL) 10 MG TABLET    Take 1 tablet (10 mg total) by mouth 4 (four) times daily. for 5 days        Medication List      START taking these medications    baclofen 10 MG tablet  Commonly known as:  LIORESAL  Take 1 tablet (10 mg total) by mouth 4 (four) times daily. for 5 days        CONTINUE taking these medications    atorvastatin 40 MG tablet  Commonly known as:  LIPITOR     buPROPion 150 MG TB24 tablet  Commonly known as:  WELLBUTRIN XL     chlorthalidone 25 MG Tab  Commonly known as:  HYGROTEN     clopidogrel 75 mg  tablet  Commonly known as:  PLAVIX     cyanocobalamin (vitamin B-12) 1,000 mcg Subl     levothyroxine 50 MCG tablet  Commonly known as:  SYNTHROID     nitroGLYCERIN 0.4 MG SL tablet  Commonly known as:  NITROSTAT     tiZANidine 4 MG tablet  Commonly known as:  ZANAFLEX     valsartan 160 MG tablet  Commonly known as:  DIOVAN     vitamin D 1000 units Tab  Commonly known as:  VITAMIN D3           Where to Get Your Medications      You can get these medications from any pharmacy    Bring a paper prescription for each of these medications  · baclofen 10 MG tablet         Follow-up Information     Blayne Santos MD. Schedule an appointment as soon as possible for a visit in 3 days.    Specialty:  Internal Medicine  Contact information:  [address] [place] [id number] [phone]                         Medical Decision Making:   Clinical Tests:   Lab Tests: Ordered and Reviewed  Radiological Study: Ordered and Reviewed  Medical Tests: Ordered and Reviewed             Scribe Attestation:   Scribe #1: I performed the above scribed service and the documentation accurately describes the services I performed. I attest to the accuracy of the note.     Attending:   Physician Attestation Statement for Scribe #1: I, John Mercado Jr., MD, personally performed the services described in this documentation, as scribed by Thor Bradshaw, in my presence, and it is both accurate and complete.       Scribe Attestation:   Scribe #2: I performed the above scribed service and the documentation accurately describes the services I performed. I attest to the accuracy of the note.    Attending Attestation:           Physician Attestation for Scribe:    Physician Attestation Statement for Scribe #2: I, Misa Garcia MD, reviewed documentation, as scribed by Rojelio Rivera in my presence, and it is both accurate and complete. I also acknowledge and confirm the content of the note done by Scribe #1.           Clinical Impression        ICD-10-CM ICD-9-CM   1. Muscular aches M79.10 729.1   2. Shoulder pain M25.519 719.41   3. Chest pain R07.9 786.50       Disposition:   Disposition: Discharged  Condition: Stable         John Mercado Jr., MD  08/07/19 1009

## 2019-08-05 NOTE — ED NOTES
Pt stated that she just now started to have chest pain 10/10. Pain does not radiate anywhere but it more painful than her back. Notified MD, EKG orders were placed.

## 2019-08-05 NOTE — DISCHARGE INSTRUCTIONS
Please follow up with her primary care physician.  At this time you have musculoskeletal pain.  I recommend that you ice the region and have it massaged out.  Take baclofen as needed for spasms or pain however be aware that it is sedating in I recommend that you do not drive or operate heavy machinery on it.  I also recommend that you have for outpatient physical therapy.  If symptoms worsen or you have chest pain shortness of breath please come back to emergency department without hesitation.

## 2021-07-28 ENCOUNTER — OFFICE VISIT (OUTPATIENT)
Dept: OBSTETRICS AND GYNECOLOGY | Facility: CLINIC | Age: 86
End: 2021-07-28
Payer: MEDICARE

## 2021-07-28 VITALS
HEIGHT: 60 IN | BODY MASS INDEX: 25.88 KG/M2 | WEIGHT: 131.81 LBS | SYSTOLIC BLOOD PRESSURE: 140 MMHG | DIASTOLIC BLOOD PRESSURE: 86 MMHG

## 2021-07-28 DIAGNOSIS — N95.2 VAGINAL ATROPHY: Primary | ICD-10-CM

## 2021-07-28 PROCEDURE — 1159F MED LIST DOCD IN RCRD: CPT | Mod: CPTII,S$GLB,, | Performed by: OBSTETRICS & GYNECOLOGY

## 2021-07-28 PROCEDURE — 1160F RVW MEDS BY RX/DR IN RCRD: CPT | Mod: CPTII,S$GLB,, | Performed by: OBSTETRICS & GYNECOLOGY

## 2021-07-28 PROCEDURE — 1160F PR REVIEW ALL MEDS BY PRESCRIBER/CLIN PHARMACIST DOCUMENTED: ICD-10-PCS | Mod: CPTII,S$GLB,, | Performed by: OBSTETRICS & GYNECOLOGY

## 2021-07-28 PROCEDURE — 99203 PR OFFICE/OUTPT VISIT, NEW, LEVL III, 30-44 MIN: ICD-10-PCS | Mod: S$GLB,,, | Performed by: OBSTETRICS & GYNECOLOGY

## 2021-07-28 PROCEDURE — 3288F FALL RISK ASSESSMENT DOCD: CPT | Mod: CPTII,S$GLB,, | Performed by: OBSTETRICS & GYNECOLOGY

## 2021-07-28 PROCEDURE — 99999 PR PBB SHADOW E&M-EST. PATIENT-LVL III: ICD-10-PCS | Mod: PBBFAC,,, | Performed by: OBSTETRICS & GYNECOLOGY

## 2021-07-28 PROCEDURE — 3288F PR FALLS RISK ASSESSMENT DOCUMENTED: ICD-10-PCS | Mod: CPTII,S$GLB,, | Performed by: OBSTETRICS & GYNECOLOGY

## 2021-07-28 PROCEDURE — 99203 OFFICE O/P NEW LOW 30 MIN: CPT | Mod: S$GLB,,, | Performed by: OBSTETRICS & GYNECOLOGY

## 2021-07-28 PROCEDURE — 1126F AMNT PAIN NOTED NONE PRSNT: CPT | Mod: CPTII,S$GLB,, | Performed by: OBSTETRICS & GYNECOLOGY

## 2021-07-28 PROCEDURE — 1126F PR PAIN SEVERITY QUANTIFIED, NO PAIN PRESENT: ICD-10-PCS | Mod: CPTII,S$GLB,, | Performed by: OBSTETRICS & GYNECOLOGY

## 2021-07-28 PROCEDURE — 1101F PR PT FALLS ASSESS DOC 0-1 FALLS W/OUT INJ PAST YR: ICD-10-PCS | Mod: CPTII,S$GLB,, | Performed by: OBSTETRICS & GYNECOLOGY

## 2021-07-28 PROCEDURE — 1159F PR MEDICATION LIST DOCUMENTED IN MEDICAL RECORD: ICD-10-PCS | Mod: CPTII,S$GLB,, | Performed by: OBSTETRICS & GYNECOLOGY

## 2021-07-28 PROCEDURE — 1101F PT FALLS ASSESS-DOCD LE1/YR: CPT | Mod: CPTII,S$GLB,, | Performed by: OBSTETRICS & GYNECOLOGY

## 2021-07-28 PROCEDURE — 99999 PR PBB SHADOW E&M-EST. PATIENT-LVL III: CPT | Mod: PBBFAC,,, | Performed by: OBSTETRICS & GYNECOLOGY

## 2021-07-28 RX ORDER — DULAGLUTIDE 1.5 MG/.5ML
INJECTION, SOLUTION SUBCUTANEOUS
COMMUNITY
Start: 2021-05-02

## 2021-07-28 RX ORDER — ESTRADIOL 0.1 MG/G
1 CREAM VAGINAL
Qty: 42.5 G | Refills: 12 | Status: SHIPPED | OUTPATIENT
Start: 2021-07-28 | End: 2022-07-28

## 2025-03-29 ENCOUNTER — HOSPITAL ENCOUNTER (EMERGENCY)
Facility: HOSPITAL | Age: 89
Discharge: HOME | End: 2025-03-30
Attending: EMERGENCY MEDICINE
Payer: MEDICARE

## 2025-03-29 DIAGNOSIS — R05.9 COUGH: ICD-10-CM

## 2025-03-29 DIAGNOSIS — K44.9 HIATAL HERNIA: ICD-10-CM

## 2025-03-29 DIAGNOSIS — N30.01 ACUTE CYSTITIS WITH HEMATURIA: ICD-10-CM

## 2025-03-29 DIAGNOSIS — I49.9 CARDIAC RHYTHM DISORDER OR DISTURBANCE OR CHANGE: ICD-10-CM

## 2025-03-29 DIAGNOSIS — M62.838 MUSCLE SPASM: ICD-10-CM

## 2025-03-29 DIAGNOSIS — K86.2 PANCREATIC CYST: ICD-10-CM

## 2025-03-29 DIAGNOSIS — J22 LOWER RESPIRATORY INFECTION: Primary | ICD-10-CM

## 2025-03-29 LAB
ABSOLUTE EOSINOPHIL (OHS): 0.11 K/UL
ABSOLUTE MONOCYTE (OHS): 0.34 K/UL (ref 0.3–1)
ABSOLUTE NEUTROPHIL COUNT (OHS): 2.75 K/UL (ref 1.8–7.7)
ALBUMIN SERPL BCP-MCNC: 3.9 G/DL (ref 3.5–5.2)
ALP SERPL-CCNC: 170 UNIT/L (ref 40–150)
ALT SERPL W/O P-5'-P-CCNC: 20 UNIT/L (ref 10–44)
ANION GAP (OHS): 12 MMOL/L (ref 8–16)
AST SERPL-CCNC: 20 UNIT/L (ref 11–45)
BACTERIA #/AREA URNS AUTO: ABNORMAL /HPF
BASOPHILS # BLD AUTO: 0.01 K/UL
BASOPHILS NFR BLD AUTO: 0.2 %
BILIRUB SERPL-MCNC: 0.3 MG/DL (ref 0.1–1)
BILIRUB UR QL STRIP.AUTO: NEGATIVE
BNP SERPL-MCNC: 14 PG/ML (ref 0–99)
BUN SERPL-MCNC: 43 MG/DL (ref 8–23)
CALCIUM SERPL-MCNC: 10.7 MG/DL (ref 8.7–10.5)
CHLORIDE SERPL-SCNC: 107 MMOL/L (ref 95–110)
CLARITY UR: ABNORMAL
CO2 SERPL-SCNC: 22 MMOL/L (ref 23–29)
COLOR UR AUTO: YELLOW
CREAT SERPL-MCNC: 1.9 MG/DL (ref 0.5–1.4)
ERYTHROCYTE [DISTWIDTH] IN BLOOD BY AUTOMATED COUNT: 12.6 % (ref 11.5–14.5)
GFR SERPLBLD CREATININE-BSD FMLA CKD-EPI: 25 ML/MIN/1.73/M2
GLUCOSE SERPL-MCNC: 146 MG/DL (ref 70–110)
GLUCOSE UR QL STRIP: NEGATIVE
HCT VFR BLD AUTO: 34.8 % (ref 37–48.5)
HCV AB SERPL QL IA: NEGATIVE
HGB BLD-MCNC: 11.5 GM/DL (ref 12–16)
HGB UR QL STRIP: NEGATIVE
HIV 1+2 AB+HIV1 P24 AG SERPL QL IA: NEGATIVE
IMM GRANULOCYTES # BLD AUTO: 0.01 K/UL (ref 0–0.04)
IMM GRANULOCYTES NFR BLD AUTO: 0.2 % (ref 0–0.5)
INFLUENZA A MOLECULAR (OHS): NEGATIVE
INFLUENZA B MOLECULAR (OHS): NEGATIVE
KETONES UR QL STRIP: NEGATIVE
LEUKOCYTE ESTERASE UR QL STRIP: ABNORMAL
LYMPHOCYTES # BLD AUTO: 1.56 K/UL (ref 1–4.8)
MCH RBC QN AUTO: 28.1 PG (ref 27–50)
MCHC RBC AUTO-ENTMCNC: 33 G/DL (ref 32–36)
MCV RBC AUTO: 85 FL (ref 82–98)
MICROSCOPIC COMMENT: ABNORMAL
NITRITE UR QL STRIP: NEGATIVE
NUCLEATED RBC (/100WBC) (OHS): 0 /100 WBC
PH UR STRIP: 5 [PH]
PLATELET # BLD AUTO: 255 K/UL (ref 150–450)
PMV BLD AUTO: 9.5 FL (ref 9.2–12.9)
POTASSIUM SERPL-SCNC: 4.5 MMOL/L (ref 3.5–5.1)
PROT SERPL-MCNC: 7.8 GM/DL (ref 6–8.4)
PROT UR QL STRIP: ABNORMAL
RBC # BLD AUTO: 4.09 M/UL (ref 4–5.4)
RBC #/AREA URNS AUTO: 5 /HPF (ref 0–4)
RELATIVE EOSINOPHIL (OHS): 2.3 %
RELATIVE LYMPHOCYTE (OHS): 32.6 % (ref 18–48)
RELATIVE MONOCYTE (OHS): 7.1 % (ref 4–15)
RELATIVE NEUTROPHIL (OHS): 57.6 % (ref 38–73)
SARS-COV-2 RDRP RESP QL NAA+PROBE: NEGATIVE
SODIUM SERPL-SCNC: 141 MMOL/L (ref 136–145)
SP GR UR STRIP: 1.02
SQUAMOUS #/AREA URNS AUTO: 1 /HPF
TROPONIN I SERPL DL<=0.01 NG/ML-MCNC: 0.01 NG/ML
UROBILINOGEN UR STRIP-ACNC: NEGATIVE EU/DL
WBC # BLD AUTO: 4.78 K/UL (ref 3.9–12.7)
WBC #/AREA URNS AUTO: >100 /HPF (ref 0–5)
WBC CLUMPS UR QL AUTO: ABNORMAL

## 2025-03-29 PROCEDURE — 99285 EMERGENCY DEPT VISIT HI MDM: CPT | Mod: 25

## 2025-03-29 PROCEDURE — 87389 HIV-1 AG W/HIV-1&-2 AB AG IA: CPT | Performed by: EMERGENCY MEDICINE

## 2025-03-29 PROCEDURE — 86803 HEPATITIS C AB TEST: CPT | Performed by: EMERGENCY MEDICINE

## 2025-03-29 PROCEDURE — 84484 ASSAY OF TROPONIN QUANT: CPT

## 2025-03-29 PROCEDURE — 93005 ELECTROCARDIOGRAM TRACING: CPT

## 2025-03-29 PROCEDURE — 96361 HYDRATE IV INFUSION ADD-ON: CPT

## 2025-03-29 PROCEDURE — 25000003 PHARM REV CODE 250: Performed by: EMERGENCY MEDICINE

## 2025-03-29 PROCEDURE — 63600175 PHARM REV CODE 636 W HCPCS: Performed by: EMERGENCY MEDICINE

## 2025-03-29 PROCEDURE — 85025 COMPLETE CBC W/AUTO DIFF WBC: CPT

## 2025-03-29 PROCEDURE — 93010 ELECTROCARDIOGRAM REPORT: CPT | Mod: ,,, | Performed by: INTERNAL MEDICINE

## 2025-03-29 PROCEDURE — 87086 URINE CULTURE/COLONY COUNT: CPT

## 2025-03-29 PROCEDURE — 81003 URINALYSIS AUTO W/O SCOPE: CPT

## 2025-03-29 PROCEDURE — 96375 TX/PRO/DX INJ NEW DRUG ADDON: CPT

## 2025-03-29 PROCEDURE — 80053 COMPREHEN METABOLIC PANEL: CPT

## 2025-03-29 PROCEDURE — 25000242 PHARM REV CODE 250 ALT 637 W/ HCPCS: Performed by: EMERGENCY MEDICINE

## 2025-03-29 PROCEDURE — 83690 ASSAY OF LIPASE: CPT | Performed by: EMERGENCY MEDICINE

## 2025-03-29 PROCEDURE — 83880 ASSAY OF NATRIURETIC PEPTIDE: CPT

## 2025-03-29 PROCEDURE — 87502 INFLUENZA DNA AMP PROBE: CPT

## 2025-03-29 PROCEDURE — U0002 COVID-19 LAB TEST NON-CDC: HCPCS

## 2025-03-29 PROCEDURE — 96374 THER/PROPH/DIAG INJ IV PUSH: CPT

## 2025-03-29 RX ORDER — FENTANYL CITRATE 50 UG/ML
25 INJECTION, SOLUTION INTRAMUSCULAR; INTRAVENOUS
Refills: 0 | Status: COMPLETED | OUTPATIENT
Start: 2025-03-29 | End: 2025-03-29

## 2025-03-29 RX ORDER — ONDANSETRON HYDROCHLORIDE 2 MG/ML
4 INJECTION, SOLUTION INTRAVENOUS
Status: COMPLETED | OUTPATIENT
Start: 2025-03-29 | End: 2025-03-29

## 2025-03-29 RX ORDER — VALSARTAN 80 MG/1
80 TABLET ORAL
COMMUNITY

## 2025-03-29 RX ORDER — CEFTRIAXONE 1 G/1
1 INJECTION, POWDER, FOR SOLUTION INTRAMUSCULAR; INTRAVENOUS
Status: COMPLETED | OUTPATIENT
Start: 2025-03-29 | End: 2025-03-29

## 2025-03-29 RX ORDER — OXYCODONE AND ACETAMINOPHEN 10; 325 MG/1; MG/1
1 TABLET ORAL
Refills: 0 | Status: COMPLETED | OUTPATIENT
Start: 2025-03-29 | End: 2025-03-29

## 2025-03-29 RX ORDER — IPRATROPIUM BROMIDE AND ALBUTEROL SULFATE 2.5; .5 MG/3ML; MG/3ML
3 SOLUTION RESPIRATORY (INHALATION)
Status: COMPLETED | OUTPATIENT
Start: 2025-03-29 | End: 2025-03-29

## 2025-03-29 RX ORDER — DIAZEPAM 10 MG/2ML
2 INJECTION INTRAMUSCULAR
Status: COMPLETED | OUTPATIENT
Start: 2025-03-29 | End: 2025-03-29

## 2025-03-29 RX ADMIN — ONDANSETRON 4 MG: 2 INJECTION INTRAMUSCULAR; INTRAVENOUS at 09:03

## 2025-03-29 RX ADMIN — IPRATROPIUM BROMIDE AND ALBUTEROL SULFATE 3 ML: 2.5; .5 SOLUTION RESPIRATORY (INHALATION) at 09:03

## 2025-03-29 RX ADMIN — CEFTRIAXONE 1 G: 1 INJECTION, POWDER, FOR SOLUTION INTRAMUSCULAR; INTRAVENOUS at 11:03

## 2025-03-29 RX ADMIN — FENTANYL CITRATE 25 MCG: 50 INJECTION INTRAMUSCULAR; INTRAVENOUS at 09:03

## 2025-03-29 RX ADMIN — DIAZEPAM 2 MG: 10 INJECTION, SOLUTION INTRAMUSCULAR; INTRAVENOUS at 11:03

## 2025-03-29 RX ADMIN — SODIUM CHLORIDE 1000 ML: 9 INJECTION, SOLUTION INTRAVENOUS at 11:03

## 2025-03-29 RX ADMIN — OXYCODONE AND ACETAMINOPHEN 1 TABLET: 325; 10 TABLET ORAL at 10:03

## 2025-03-30 VITALS
HEART RATE: 105 BPM | RESPIRATION RATE: 22 BRPM | HEIGHT: 60 IN | DIASTOLIC BLOOD PRESSURE: 54 MMHG | TEMPERATURE: 98 F | OXYGEN SATURATION: 100 % | BODY MASS INDEX: 28.61 KG/M2 | SYSTOLIC BLOOD PRESSURE: 114 MMHG | WEIGHT: 145.75 LBS

## 2025-03-30 LAB
LIPASE SERPL-CCNC: 17 U/L (ref 4–60)
OHS QRS DURATION: 112 MS
OHS QTC CALCULATION: 400 MS

## 2025-03-30 PROCEDURE — 96361 HYDRATE IV INFUSION ADD-ON: CPT

## 2025-03-30 RX ORDER — ALBUTEROL SULFATE 90 UG/1
2 INHALANT RESPIRATORY (INHALATION) EVERY 6 HOURS PRN
Qty: 6.7 G | Refills: 0 | Status: ON HOLD | OUTPATIENT
Start: 2025-03-30 | End: 2025-04-04

## 2025-03-30 RX ORDER — LEVOFLOXACIN 250 MG/1
250 TABLET ORAL DAILY
Qty: 7 TABLET | Refills: 0 | Status: ON HOLD | OUTPATIENT
Start: 2025-03-30 | End: 2025-04-04

## 2025-03-30 RX ORDER — METHYLPREDNISOLONE 4 MG/1
TABLET ORAL
Qty: 21 EACH | Refills: 0 | Status: ON HOLD | OUTPATIENT
Start: 2025-03-30 | End: 2025-04-04 | Stop reason: HOSPADM

## 2025-03-30 RX ORDER — DIAZEPAM 2 MG/1
2 TABLET ORAL EVERY 6 HOURS PRN
Qty: 20 TABLET | Refills: 0 | Status: SHIPPED | OUTPATIENT
Start: 2025-03-30

## 2025-03-30 NOTE — DISCHARGE INSTRUCTIONS
Indeterminate pancreatic cystic lesions for which further characterization with outpatient MRCP is recommended.     You have received treatment with steroids.  This may increase your blood sugar.  Please monitor closely and come to the ER if you experience any glucose readings greater than 300, abdominal pain, nausea, or vomiting.

## 2025-03-30 NOTE — ED PROVIDER NOTES
"SCRIBE #1 NOTE: I, Hai Moreno, am scribing for, and in the presence of, Vibha Glynn DO. I have scribed the entire note.       History     Chief Complaint   Patient presents with    Back Pain     Pt c/o thoracic back pain that radiates to her ribs. Pt states that she has had an upper respiratory infection and cough recently and feels like she "cracked a rib from coughing". +N/+V/-D. Denies fever.    Cough     Productive cough with white phlem     Review of patient's allergies indicates:   Allergen Reactions    Codeine      "stomach cramps"    Morphine          History of Present Illness     HPI    3/29/2025, 9:25 PM  History obtained from the patient and medical records      History of Present Illness: Lali Floyd is a 89 y.o. female patient with a PMHx of DM Type 2, HTN, depression, arthritis, thyroid disease, and MI who presents to the Emergency Department for evaluation of productive persistent cough with white phlegm which began Monday. Pt states the coughing has been causing her to have left-sided ribs/back pain. Pt states she was dx with an upper respiratory infection on Tuesday by her PCP. Symptoms are constant and moderate in severity. No mitigating or exacerbating factors reported. Associated sxs include nausea, vomiting, and wheezing. Patient denies any diarrhea, fever, CP, or SOB. No prior Tx specified.  No further complaints or concerns at this time.       Arrival mode: Personal Transportation    PCP: Blayne Santos MD        Past Medical History:  Past Medical History:   Diagnosis Date    Arthritis     Cancer     Coronary artery disease     Depression     Diabetes mellitus     History of heart artery stent     Hypertension     MI (myocardial infarction) 2005    Renal disorder     Thyroid disease        Past Surgical History:  Past Surgical History:   Procedure Laterality Date    APPENDECTOMY       SECTION      CHOLECYSTECTOMY      HYSTERECTOMY      LEFT HEART CATHETERIZATION " Left 4/3/2025    Procedure: Left heart cath;  Surgeon: Eitan Quiroz MD;  Location: [facility] CATH LAB;  Service: Cardiology;  Laterality: Left;         Family History:  No family history on file.    Social History:  Social History     Tobacco Use    Smoking status: Never    Smokeless tobacco: Not on file   Substance and Sexual Activity    Alcohol use: Not Currently    Drug use: Never    Sexual activity: Yes        Review of Systems     Review of Systems   Constitutional:  Negative for fever.   Respiratory:  Positive for cough (productive) and wheezing. Negative for shortness of breath.    Cardiovascular:  Negative for chest pain.   Gastrointestinal:  Positive for nausea and vomiting. Negative for diarrhea.   Musculoskeletal:  Positive for back pain (left sided) and myalgias (left ribs).      Physical Exam     Initial Vitals [03/29/25 1908]   BP Pulse Resp Temp SpO2   (!) 198/77 94 19 98.1 °F (36.7 °C) 100 %      MAP       --          Physical Exam  Nursing Notes and Vital Signs Reviewed.  Constitutional: Patient is in no acute distress. Well-developed and well-nourished.  Head: Atraumatic. Normocephalic.  Eyes: PERRL. EOM intact. Conjunctivae are not pale. No scleral icterus.  ENT: Mucous membranes are moist. Oropharynx is clear and symmetric. Nasal congestion.   Neck: Supple. Full ROM. No lymphadenopathy.  Cardiovascular: Regular rate. Regular rhythm. No murmurs, rubs, or gallops.   Pulmonary/Chest:  Mild tachypnea. Coarse wheezing in lungs.   Abdominal: Soft and non-distended.  There is no tenderness.  No rebound, guarding, or rigidity. Musculoskeletal: Moves all extremities. No obvious deformities. No edema. No calf tenderness.  Skin: Warm and dry.  Neurological:  Alert, awake, and appropriate.  Normal speech.  No acute focal neurological deficits are appreciated.  Psychiatric: Normal affect. Good eye contact. Appropriate in content.     ED Course   Procedures  ED Vital Signs:  Vitals:    03/29/25 1908 03/29/25  2030 03/29/25 2100 03/29/25 2150   BP: (!) 198/77 (!) 153/69 (!) 165/72    Pulse: 94 68 65    Resp: 19 19 19 18   Temp: 98.1 °F (36.7 °C)      SpO2: 100% 100% 100%    Weight: 66.1 kg (145 lb 11.6 oz)      Height: 5' (1.524 m)       03/29/25 2154 03/29/25 2157 03/29/25 2215 03/29/25 2226   BP:  (!) 162/82 134/62    Pulse: 75 60 90 109   Resp: 20 16 (!) 27 (!) 26   Temp:       SpO2:  95% 96% 100%   Weight:       Height:        03/29/25 2233 03/29/25 2300 03/29/25 2302 03/29/25 2328   BP: (!) 178/77  (!) 145/64 (!) 148/67   Pulse: 109 (S) (!) 154 (!) 135 (!) 126   Resp: (!) 26  (!) 28 13   Temp:       SpO2: 100%  98% 96%   Weight:       Height:        03/29/25 2333 03/30/25 0030 03/30/25 0126   BP: (!) 148/67 (!) 116/54 (!) 114/54   Pulse: (!) 127 (!) 111 105   Resp: 12 13 (!) 22   Temp: 98.1 °F (36.7 °C)  98.1 °F (36.7 °C)   SpO2:  97% 100%   Weight:      Height:          Abnormal Lab Results:  Labs Reviewed   COMPREHENSIVE METABOLIC PANEL - Abnormal       Result Value    Sodium 141      Potassium 4.5      Chloride 107      CO2 22 (*)     Glucose 146 (*)     BUN 43 (*)     Creatinine 1.9 (*)     Calcium 10.7 (*)     Protein Total 7.8      Albumin 3.9      Bilirubin Total 0.3       (*)     AST 20      ALT 20      Anion Gap 12      eGFR 25 (*)    URINALYSIS, REFLEX TO URINE CULTURE - Abnormal    Color, UA Yellow      Appearance, UA Hazy (*)     pH, UA 5.0      Spec Grav UA 1.020      Protein, UA Trace (*)     Glucose, UA Negative      Ketones, UA Negative      Bilirubin, UA Negative      Blood, UA Negative      Nitrites, UA Negative      Urobilinogen, UA Negative      Leukocyte Esterase, UA 3+ (*)    CBC WITH DIFFERENTIAL - Abnormal    WBC 4.78      RBC 4.09      HGB 11.5 (*)     HCT 34.8 (*)     MCV 85      MCH 28.1      MCHC 33.0      RDW 12.6      Platelet Count 255      MPV 9.5      Nucleated RBC 0      Neut % 57.6      Lymph % 32.6      Mono % 7.1      Eos % 2.3      Basophil % 0.2      Imm Grans % 0.2       Neut # 2.75      Lymph # 1.56      Mono # 0.34      Eos # 0.11      Baso # 0.01      Imm Grans # 0.01     URINALYSIS MICROSCOPIC - Abnormal    RBC, UA 5 (*)     WBC, UA >100 (*)     WBC Clumps, UA Many (*)     Bacteria, UA Rare      Squamous Epithelial Cells, UA 1      Microscopic Comment       INFLUENZA A & B BY MOLECULAR - Normal    INFLUENZA A MOLECULAR Negative      INFLUENZA B MOLECULAR  Negative     HEPATITIS C ANTIBODY - Normal    Hep C Ab Interp Negative     HIV 1 / 2 ANTIBODY - Normal    HIV 1/2 Ag/Ab Negative     TROPONIN I - Normal    Troponin-I 0.012     B-TYPE NATRIURETIC PEPTIDE - Normal    BNP 14     SARS-COV-2 RNA AMPLIFICATION, QUAL - Normal    SARS COV-2 Molecular Negative     LIPASE - Normal    Lipase Level 17     CULTURE, URINE    Urine Culture        Value: Multiple organisms isolated. None in predominance. Repeat if clinically necessary.   CBC W/ AUTO DIFFERENTIAL    Narrative:     The following orders were created for panel order CBC auto differential.  Procedure                               Abnormality         Status                     ---------                               -----------         ------                     CBC with Differential[[id number]]       Abnormal            Final result                 Please view results for these tests on the individual orders.   HEP C VIRUS HOLD SPECIMEN        All Lab Results:  Results for orders placed or performed during the hospital encounter of 03/29/25   Influenza A & B by Molecular    Collection Time: 03/29/25  8:07 PM    Specimen: Nasal Swab   Result Value Ref Range    INFLUENZA A MOLECULAR Negative Negative    INFLUENZA B MOLECULAR  Negative Negative   Hepatitis C Antibody    Collection Time: 03/29/25  8:07 PM   Result Value Ref Range    Hep C Ab Interp Negative Negative   HIV 1/2 Ag/Ab (4th Gen)    Collection Time: 03/29/25  8:07 PM   Result Value Ref Range    HIV 1/2 Ag/Ab Negative Negative   Comprehensive metabolic panel    Collection  Time: 03/29/25  8:07 PM   Result Value Ref Range    Sodium 141 136 - 145 mmol/L    Potassium 4.5 3.5 - 5.1 mmol/L    Chloride 107 95 - 110 mmol/L    CO2 22 (L) 23 - 29 mmol/L    Glucose 146 (H) 70 - 110 mg/dL    BUN 43 (H) 8 - 23 mg/dL    Creatinine 1.9 (H) 0.5 - 1.4 mg/dL    Calcium 10.7 (H) 8.7 - 10.5 mg/dL    Protein Total 7.8 6.0 - 8.4 gm/dL    Albumin 3.9 3.5 - 5.2 g/dL    Bilirubin Total 0.3 0.1 - 1.0 mg/dL     (H) 40 - 150 unit/L    AST 20 11 - 45 unit/L    ALT 20 10 - 44 unit/L    Anion Gap 12 8 - 16 mmol/L    eGFR 25 (L) >60 mL/min/1.73/m2   Troponin I    Collection Time: 03/29/25  8:07 PM   Result Value Ref Range    Troponin-I 0.012 <=0.026 ng/mL   Brain natriuretic peptide    Collection Time: 03/29/25  8:07 PM   Result Value Ref Range    BNP 14 0 - 99 pg/mL   COVID-19 Rapid Screening    Collection Time: 03/29/25  8:07 PM   Result Value Ref Range    SARS COV-2 Molecular Negative Negative   CBC with Differential    Collection Time: 03/29/25  8:07 PM   Result Value Ref Range    WBC 4.78 3.90 - 12.70 K/uL    RBC 4.09 4.00 - 5.40 M/uL    HGB 11.5 (L) 12.0 - 16.0 gm/dL    HCT 34.8 (L) 37.0 - 48.5 %    MCV 85 82 - 98 fL    MCH 28.1 27.0 - 50.0 pg    MCHC 33.0 32.0 - 36.0 g/dL    RDW 12.6 11.5 - 14.5 %    Platelet Count 255 150 - 450 K/uL    MPV 9.5 9.2 - 12.9 fL    Nucleated RBC 0 <=0 /100 WBC    Neut % 57.6 38 - 73 %    Lymph % 32.6 18 - 48 %    Mono % 7.1 4 - 15 %    Eos % 2.3 <=8 %    Basophil % 0.2 <=1.9 %    Imm Grans % 0.2 0.0 - 0.5 %    Neut # 2.75 1.8 - 7.7 K/uL    Lymph # 1.56 1 - 4.8 K/uL    Mono # 0.34 0.3 - 1 K/uL    Eos # 0.11 <=0.5 K/uL    Baso # 0.01 <=0.2 K/uL    Imm Grans # 0.01 0.00 - 0.04 K/uL   Lipase    Collection Time: 03/29/25  8:07 PM   Result Value Ref Range    Lipase Level 17 4 - 60 U/L   EKG 12-lead    Collection Time: 03/29/25  8:32 PM   Result Value Ref Range    QRS Duration 112 ms    [facility] QTC Calculation 400 ms   Urine culture    Collection Time: 03/29/25  9:33 PM    Specimen:  Urine   Result Value Ref Range    Urine Culture       Multiple organisms isolated. None in predominance. Repeat if clinically necessary.   Urinalysis, Reflex to Urine Culture Urine, Clean Catch    Collection Time: 03/29/25  9:33 PM    Specimen: Urine   Result Value Ref Range    Color, UA Yellow Straw, Keke, Yellow, Light-Orange    Appearance, UA Hazy (A) Clear    pH, UA 5.0 5.0 - 8.0    Spec Grav UA 1.020 1.005 - 1.030    Protein, UA Trace (A) Negative    Glucose, UA Negative Negative    Ketones, UA Negative Negative    Bilirubin, UA Negative Negative    Blood, UA Negative Negative    Nitrites, UA Negative Negative    Urobilinogen, UA Negative <2.0 EU/dL    Leukocyte Esterase, UA 3+ (A) Negative   Urinalysis Microscopic    Collection Time: 03/29/25  9:33 PM   Result Value Ref Range    RBC, UA 5 (H) 0 - 4 /HPF    WBC, UA >100 (H) 0 - 5 /HPF    WBC Clumps, UA Many (A) None, Rare    Bacteria, UA Rare None, Rare, Occasional /HPF    Squamous Epithelial Cells, UA 1 /HPF    Microscopic Comment     EKG 12-lead    Collection Time: 04/02/25 12:45 PM   Result Value Ref Range    QRS Duration 92 ms    [facility] QTC Calculation 369 ms       Imaging Results:  Imaging Results              CT Chest Abdomen Pelvis Without Contrast (XPD) (Final result)  Result time 03/29/25 23:04:13      Final result by Delano Gonzalez MD (03/29/25 23:04:13)                   Impression:     No definite acute abnormality.    Small hiatal hernia.    Indeterminate pancreatic cystic lesions for which further characterization with outpatient MRCP is recommended.    Right basilar endobronchial filling material of unclear significance.    Complete findings as above.      All CT scans at [this location] are performed using dose modulation techniques as appropriate to a performed exam including the following: automated exposure control; adjustment of the mA and/or kV according to patient size (this includes techniques or standardized protocols for targeted  exams where dose is matched to indication / reason for exam; i.e. extremities or head); use of iterative reconstruction technique.    Finalized on: 3/29/2025 11:04 PM By:  Delano Gonzalez MD  [facility]# [id number]      2025-03-29 23:06:16.450     [facility]               Narrative:    EXAM: CT CHEST ABDOMEN PELVIS WITHOUT CONTRAST(XPD)    CLINICAL HISTORY: Chest and back pain, acute    COMPARISON: None    TECHNIQUE: Standard CT thin section axial images of the chest abdomen and pelvis with reformatted sagittal and coronal images.    FINDINGS:   Right basilar endobronchial filling material.  Lungs otherwise clear.  No pleural effusion, pleural thickening, or pneumothorax.  No pathologically enlarged mediastinal or hilar lymph nodes are present.  Heart size is within normal limits.  There is no significant pericardial effusion.  No sign of aortic dissection or aortic aneurysm. Moderate LAD coronary artery calcification, calcium, or atherosclerosis is present.    The liver, spleen, and adrenal glands are not unusual in contrast-enhanced CT appearance.    Pancreatic cystic lesions and calcifications which could relate to cirrhosis and chronic pancreatitis but the cystic lesions are not fully characterized.  Recommend outpatient MRCP for better characterization.    Left renal simple cyst and nephrolithiasis.  Otherwise, the kidneys, ureters, and bladder are unremarkable. No evidence of hydronephrosis. Gallbladder surgically absent.  Bile ducts are within normal limits.    The stomach, small bowel, and colon are unremarkable.  No evidence of bowel obstruction or acute inflammatory process. No free fluid, free air, or abscess.  The appendix is not identified.    Small hiatal hernia.    Mild atelectatic atherosclerosis.  No aneurysm.  No pathologically enlarged lymph nodes.    Status post hysterectomy.    No acute or suspicious osseous lesion is evident.  Osseous degenerative changes.                                         X-Ray Chest  AP Portable (Final result)  Result time 03/29/25 20:59:00      Final result by Hakeem Sen MD (03/29/25 20:59:00)                   Impression:     No acute cardiopulmonary process.    Finalized on: 3/29/2025 8:59 PM By:  Hakeem Sen MD  [facility]# [id number]      2025-03-29 21:01:09.489     [facility]               Narrative:    EXAM: XR CHEST AP PORTABLE    CLINICAL HISTORY: Cough    FINDINGS:  Clear lungs.  No pleural effusion or pneumothorax or pulmonary edema.  Normal heart size.  Calcific plaque seen in the aortic arch.                                         The EKG was ordered, reviewed, and independently interpreted by the ED provider.  Interpretation time: 20:32  Rate: 60 BPM  Rhythm: normal sinus rhythm  Interpretation: Minimal voltage criteria for LVH, may be normal variant ([place] product). Anterior infarct, age undetermined. No STEMI.    The EKG was ordered, reviewed, and independently interpreted by the ED provider.  Interpretation time: 23:01  Rate: 131 BPM  Rhythm: sinus tachycardia with fusion complexes   Interpretation: Left bundle branch block. No STEMI.           The Emergency Provider reviewed the vital signs and test results, which are outlined above.     ED Discussion     1:03 AM: Discussed pt's case with Dr Damon ([facility]) who recommends discharging pt with an inhaler, Medrol Dosepak, and levofloxacin, recommends f/u with PCP next week.      1:22 AM: Reassessed pt at this time. Discussed with patient and/or family/caretaker all pertinent ED information and results. Discussed pt dx and plan of tx. Gave the patient all f/u and return to the ED instructions. All questions and concerns were addressed at this time. Patient and/or family/caretaker expresses understanding of information and instructions, and is comfortable with plan to discharge. Pt is stable for discharge.     I discussed with patient and/or family/caretaker that evaluation in the ED does not suggest any emergent or life  threatening medical conditions requiring immediate intervention beyond what was provided in the ED, and I believe patient is safe for discharge.  Regardless, an unremarkable evaluation in the ED does not preclude the development or presence of a serious of life threatening condition. As such, I instructed that the patient is to return immediately for any worsening or change in current symptoms.       Medical Decision Making  89-year-old female with a history of CAD, cancer, diabetes, hypertension, and CKD who presents to the ER with persistent cough causing left-sided rib and back pain.  EKG shows no acute ischemic changes.  She does have episodes of tachycardia after receiving bronchodilator therapy.  Troponin negative.  COVID and influenza negative as well.  Chest x-ray does not exhibit an acute infiltrate, pneumothorax, or any rib fractures.  Given her tachycardia CTA was ordered and is negative for pulmonary embolism.  She does have a hiatal hernia as well as pancreatic cyst.  Lipase is normal.  Incidental findings were discussed and instructed to follow up outpatient with PCP for further evaluation.  She is noted to have a suspected mucus plug.  However she has no fever here and her white count is normal.  Also with questionable UTI.  She is not hypoxic in her tachycardia has improved.  Pain seems resistant to opiates but improved with benzodiazepines.  She has CKD at baseline.  Glucose 146.  Discussed with hospital medicine who recommends treatment with bronchodilator, Medrol Dosepak with close glucose monitoring, and Levaquin to cover both urine and lung sources.  I will also give a prescription for Valium to help with the pain.      Amount and/or Complexity of Data Reviewed  Labs: ordered. Decision-making details documented in ED Course.  Radiology: ordered. Decision-making details documented in ED Course.  ECG/medicine tests: ordered and independent interpretation performed. Decision-making details  documented in ED Course.    Risk  Prescription drug management.  Decision regarding hospitalization.       Additional MDM:   Differential Diagnosis:   Pneumonia, costochondritis, musculoskeletal pain, rib fracture, pneumothorax, pulmonary embolism, COVID, influenza, bronchitis, ACS, CHF             ED Medication(s):  Medications   albuterol-ipratropium 2.5 mg-0.5 mg/3 mL nebulizer solution 3 mL (3 mLs Nebulization Given 3/29/25 2157)   fentaNYL 50 mcg/mL injection 25 mcg (25 mcg Intravenous Given 3/29/25 2150)   ondansetron injection 4 mg (4 mg Intravenous Given 3/29/25 2148)   cefTRIAXone injection 1 g (1 g Intravenous Given 3/29/25 2314)   oxyCODONE-acetaminophen  mg per tablet 1 tablet (1 tablet Oral Given 3/29/25 2233)   sodium chloride 0.9% bolus 1,000 mL 1,000 mL (0 mLs Intravenous Stopped 3/30/25 0045)   diazePAM injection 2 mg (2 mg Intravenous Given 3/29/25 2317)       Discharge Medication List as of 3/30/2025  1:21 AM        START taking these medications    Details   diazePAM (VALIUM) 2 MG tablet Take 1 tablet (2 mg total) by mouth every 6 (six) hours as needed (muscle spasm)., Starting Sun 3/30/2025, Normal      albuterol (PROVENTIL/VENTOLIN HFA) 90 mcg/actuation inhaler Inhale 2 puffs into the lungs every 6 (six) hours as needed for Wheezing or Shortness of Breath. Rescue, Starting Sun 3/30/2025, Normal      levoFLOXacin (LEVAQUIN) 250 MG tablet Take 1 tablet (250 mg total) by mouth once daily. for 7 days, Starting Sun 3/30/2025, Until Sun 4/6/2025, Normal      methylPREDNISolone (MEDROL DOSEPACK) 4 mg tablet use as directed, Normal              Follow-up Information       Blayne Santos MD On 4/2/2025.    Specialty: Internal Medicine  Contact information:  [address] [facility] [address] [phone]               [facility] - Emergency Dept..    Specialty: Emergency Medicine  Why: As needed, If symptoms worsen  Contact information:  [address]  [facility]  [address]  [phone]                               Scribe Attestation:   Scribe #1: I performed the above scribed service and the documentation accurately describes the services I performed. I attest to the accuracy of the note.     Attending:   Physician Attestation Statement for Scribe #1: I, Vibha Glynn DO, personally performed the services described in this documentation, as scribed by Hai Moreno, in my presence, and it is both accurate and complete.           Clinical Impression       ICD-10-CM ICD-9-CM   1. Lower respiratory infection  J22 519.8   2. Cough  R05.9 786.2   3. Cardiac rhythm disorder or disturbance or change  I49.9 427.9   4. Pancreatic cyst  K86.2 577.2   5. Hiatal hernia  K44.9 553.3   6. Acute cystitis with hematuria  N30.01 595.0   7. Muscle spasm  M62.838 728.85       Disposition:   Disposition: Discharged  Condition: Stable       Vibha Glynn DO  04/09/25 1047

## 2025-03-30 NOTE — ED NOTES
Pt diagnosed w/ URI by primary on Tuesday, Pt has nausea but has just been able to dry heave, and has been coughing violently, pt now having back pain and rib pain. Pt states feels like she broke a rib. Pt denies fever.

## 2025-03-30 NOTE — FIRST PROVIDER EVALUATION
Medical screening examination initiated.  I have conducted a focused provider triage encounter, findings are as follows:    Brief history of present illness:  Reports cough since Tuesday. Reports back pain and nausea that began today. Denies chest pain or SOB.     Vitals:    03/29/25 1908   BP: (!) 198/77   Pulse: 94   Resp: 19   Temp: 98.1 °F (36.7 °C)   SpO2: 100%   Weight: 66.1 kg (145 lb 11.6 oz)   Height: 5' (1.524 m)       Pertinent physical exam:  Back pain, cough    Brief workup plan:  workup, imaging.    Preliminary workup initiated; this workup will be continued and followed by the physician or advanced practice provider that is assigned to the patient when roomed.

## 2025-03-31 LAB — BACTERIA UR CULT: NORMAL

## 2025-04-02 ENCOUNTER — HOSPITAL ENCOUNTER (INPATIENT)
Facility: HOSPITAL | Age: 89
LOS: 1 days | Discharge: HOME | DRG: 281 | End: 2025-04-04
Attending: EMERGENCY MEDICINE | Admitting: STUDENT IN AN ORGANIZED HEALTH CARE EDUCATION/TRAINING PROGRAM
Payer: MEDICARE

## 2025-04-02 DIAGNOSIS — I21.4 NSTEMI (NON-ST ELEVATION MYOCARDIAL INFARCTION): ICD-10-CM

## 2025-04-02 DIAGNOSIS — R53.1 WEAKNESS: ICD-10-CM

## 2025-04-02 DIAGNOSIS — R07.89 CHEST HEAVINESS: ICD-10-CM

## 2025-04-02 DIAGNOSIS — I10 PRIMARY HYPERTENSION: ICD-10-CM

## 2025-04-02 DIAGNOSIS — I21.4 NSTEMI (NON-ST ELEVATED MYOCARDIAL INFARCTION): ICD-10-CM

## 2025-04-02 DIAGNOSIS — I21.4 NON-ST ELEVATION MYOCARDIAL INFARCTION (NSTEMI): ICD-10-CM

## 2025-04-02 DIAGNOSIS — R07.9 CHEST PAIN, UNSPECIFIED TYPE: Primary | ICD-10-CM

## 2025-04-02 DIAGNOSIS — R06.02 SOB (SHORTNESS OF BREATH): ICD-10-CM

## 2025-04-02 DIAGNOSIS — N17.9 AKI (ACUTE KIDNEY INJURY): ICD-10-CM

## 2025-04-02 DIAGNOSIS — I21.4 ACUTE NON Q WAVE MI (MYOCARDIAL INFARCTION), INITIAL EPISODE OF CARE: ICD-10-CM

## 2025-04-02 LAB
ABORH RETYPE: NORMAL
ABSOLUTE EOSINOPHIL (OHS): 0.01 K/UL
ABSOLUTE MONOCYTE (OHS): 0.49 K/UL (ref 0.3–1)
ABSOLUTE NEUTROPHIL COUNT (OHS): 7.1 K/UL (ref 1.8–7.7)
ALBUMIN SERPL BCP-MCNC: 3.9 G/DL (ref 3.5–5.2)
ALP SERPL-CCNC: 143 UNIT/L (ref 40–150)
ALT SERPL W/O P-5'-P-CCNC: 20 UNIT/L (ref 10–44)
ANION GAP (OHS): 10 MMOL/L (ref 8–16)
APTT PPP: 21.7 SECONDS (ref 21–32)
AST SERPL-CCNC: 20 UNIT/L (ref 11–45)
BACTERIA #/AREA URNS AUTO: ABNORMAL /HPF
BASOPHILS # BLD AUTO: 0.01 K/UL
BASOPHILS NFR BLD AUTO: 0.1 %
BILIRUB SERPL-MCNC: 0.4 MG/DL (ref 0.1–1)
BILIRUB UR QL STRIP.AUTO: NEGATIVE
BUN SERPL-MCNC: 46 MG/DL (ref 8–23)
CALCIUM SERPL-MCNC: 10.7 MG/DL (ref 8.7–10.5)
CHLORIDE SERPL-SCNC: 109 MMOL/L (ref 95–110)
CK SERPL-CCNC: 134 U/L (ref 20–180)
CLARITY UR: CLEAR
CO2 SERPL-SCNC: 21 MMOL/L (ref 23–29)
COLOR UR AUTO: COLORLESS
CREAT SERPL-MCNC: 1.6 MG/DL (ref 0.5–1.4)
ERYTHROCYTE [DISTWIDTH] IN BLOOD BY AUTOMATED COUNT: 12.6 % (ref 11.5–14.5)
GFR SERPLBLD CREATININE-BSD FMLA CKD-EPI: 31 ML/MIN/1.73/M2
GLUCOSE SERPL-MCNC: 75 MG/DL (ref 70–110)
GLUCOSE UR QL STRIP: NEGATIVE
HCT VFR BLD AUTO: 34.3 % (ref 37–48.5)
HGB BLD-MCNC: 11.1 GM/DL (ref 12–16)
HGB UR QL STRIP: NEGATIVE
HYALINE CASTS UR QL AUTO: 2 /LPF (ref 0–1)
IMM GRANULOCYTES # BLD AUTO: 0.06 K/UL (ref 0–0.04)
IMM GRANULOCYTES NFR BLD AUTO: 0.7 % (ref 0–0.5)
INDIRECT COOMBS: NORMAL
INR PPP: 1 (ref 0.8–1.2)
KETONES UR QL STRIP: NEGATIVE
LEUKOCYTE ESTERASE UR QL STRIP: ABNORMAL
LYMPHOCYTES # BLD AUTO: 1.16 K/UL (ref 1–4.8)
MCH RBC QN AUTO: 27.9 PG (ref 27–31)
MCHC RBC AUTO-ENTMCNC: 32.4 G/DL (ref 32–36)
MCV RBC AUTO: 86 FL (ref 82–98)
MICROSCOPIC COMMENT: ABNORMAL
NITRITE UR QL STRIP: NEGATIVE
NUCLEATED RBC (/100WBC) (OHS): 0 /100 WBC
PH UR STRIP: 6 [PH]
PLATELET # BLD AUTO: 289 K/UL (ref 150–450)
PMV BLD AUTO: 9.8 FL (ref 9.2–12.9)
POTASSIUM SERPL-SCNC: 4.5 MMOL/L (ref 3.5–5.1)
PROT SERPL-MCNC: 7.7 GM/DL (ref 6–8.4)
PROT UR QL STRIP: NEGATIVE
PROTHROMBIN TIME: 11.5 SECONDS (ref 9–12.5)
RBC # BLD AUTO: 3.98 M/UL (ref 4–5.4)
RELATIVE EOSINOPHIL (OHS): 0.1 %
RELATIVE LYMPHOCYTE (OHS): 13.1 % (ref 18–48)
RELATIVE MONOCYTE (OHS): 5.5 % (ref 4–15)
RELATIVE NEUTROPHIL (OHS): 80.5 % (ref 38–73)
RH BLD: NORMAL
SODIUM SERPL-SCNC: 140 MMOL/L (ref 136–145)
SP GR UR STRIP: 1.01
SPECIMEN OUTDATE: NORMAL
TROPONIN I SERPL DL<=0.01 NG/ML-MCNC: 0.24 NG/ML
TROPONIN I SERPL DL<=0.01 NG/ML-MCNC: 0.27 NG/ML
TROPONIN I SERPL DL<=0.01 NG/ML-MCNC: 0.3 NG/ML
UROBILINOGEN UR STRIP-ACNC: NEGATIVE EU/DL
WBC # BLD AUTO: 8.83 K/UL (ref 3.9–12.7)
WBC #/AREA URNS AUTO: 3 /HPF (ref 0–5)

## 2025-04-02 PROCEDURE — 83036 HEMOGLOBIN GLYCOSYLATED A1C: CPT | Performed by: NURSE PRACTITIONER

## 2025-04-02 PROCEDURE — 93005 ELECTROCARDIOGRAM TRACING: CPT

## 2025-04-02 PROCEDURE — 96375 TX/PRO/DX INJ NEW DRUG ADDON: CPT

## 2025-04-02 PROCEDURE — 63600175 PHARM REV CODE 636 W HCPCS: Performed by: EMERGENCY MEDICINE

## 2025-04-02 PROCEDURE — G0378 HOSPITAL OBSERVATION PER HR: HCPCS

## 2025-04-02 PROCEDURE — 96366 THER/PROPH/DIAG IV INF ADDON: CPT

## 2025-04-02 PROCEDURE — 82550 ASSAY OF CK (CPK): CPT | Performed by: NURSE PRACTITIONER

## 2025-04-02 PROCEDURE — 93010 ELECTROCARDIOGRAM REPORT: CPT | Mod: ,,, | Performed by: INTERNAL MEDICINE

## 2025-04-02 PROCEDURE — 80061 LIPID PANEL: CPT | Performed by: NURSE PRACTITIONER

## 2025-04-02 PROCEDURE — 80053 COMPREHEN METABOLIC PANEL: CPT | Performed by: NURSE PRACTITIONER

## 2025-04-02 PROCEDURE — 99285 EMERGENCY DEPT VISIT HI MDM: CPT | Mod: 25

## 2025-04-02 PROCEDURE — 96365 THER/PROPH/DIAG IV INF INIT: CPT

## 2025-04-02 PROCEDURE — 36415 COLL VENOUS BLD VENIPUNCTURE: CPT | Performed by: NURSE PRACTITIONER

## 2025-04-02 PROCEDURE — 84484 ASSAY OF TROPONIN QUANT: CPT | Mod: 91 | Performed by: NURSE PRACTITIONER

## 2025-04-02 PROCEDURE — 25000003 PHARM REV CODE 250: Performed by: EMERGENCY MEDICINE

## 2025-04-02 PROCEDURE — 86900 BLOOD TYPING SEROLOGIC ABO: CPT | Performed by: NURSE PRACTITIONER

## 2025-04-02 PROCEDURE — 85025 COMPLETE CBC W/AUTO DIFF WBC: CPT | Performed by: NURSE PRACTITIONER

## 2025-04-02 PROCEDURE — 85610 PROTHROMBIN TIME: CPT | Performed by: EMERGENCY MEDICINE

## 2025-04-02 PROCEDURE — 81003 URINALYSIS AUTO W/O SCOPE: CPT | Performed by: NURSE PRACTITIONER

## 2025-04-02 PROCEDURE — 36415 COLL VENOUS BLD VENIPUNCTURE: CPT | Mod: 59 | Performed by: HOSPITALIST

## 2025-04-02 PROCEDURE — 85730 THROMBOPLASTIN TIME PARTIAL: CPT | Performed by: EMERGENCY MEDICINE

## 2025-04-02 RX ORDER — NAPROXEN SODIUM 220 MG/1
81 TABLET, FILM COATED ORAL DAILY
Status: DISCONTINUED | OUTPATIENT
Start: 2025-04-03 | End: 2025-04-04 | Stop reason: HOSPADM

## 2025-04-02 RX ORDER — ACETAMINOPHEN 325 MG/1
650 TABLET ORAL EVERY 6 HOURS PRN
Status: DISCONTINUED | OUTPATIENT
Start: 2025-04-02 | End: 2025-04-03 | Stop reason: SDUPTHER

## 2025-04-02 RX ORDER — ACETAMINOPHEN 500 MG
1000 TABLET ORAL
Status: COMPLETED | OUTPATIENT
Start: 2025-04-02 | End: 2025-04-02

## 2025-04-02 RX ORDER — TALC
6 POWDER (GRAM) TOPICAL NIGHTLY PRN
Status: DISCONTINUED | OUTPATIENT
Start: 2025-04-02 | End: 2025-04-04 | Stop reason: HOSPADM

## 2025-04-02 RX ORDER — NITROGLYCERIN 0.4 MG/1
0.4 TABLET SUBLINGUAL EVERY 5 MIN PRN
Status: DISCONTINUED | OUTPATIENT
Start: 2025-04-02 | End: 2025-04-04 | Stop reason: HOSPADM

## 2025-04-02 RX ORDER — LORAZEPAM 2 MG/ML
0.5 INJECTION INTRAMUSCULAR
Status: COMPLETED | OUTPATIENT
Start: 2025-04-02 | End: 2025-04-02

## 2025-04-02 RX ORDER — ONDANSETRON HYDROCHLORIDE 2 MG/ML
4 INJECTION, SOLUTION INTRAVENOUS EVERY 6 HOURS PRN
Status: DISCONTINUED | OUTPATIENT
Start: 2025-04-02 | End: 2025-04-04 | Stop reason: HOSPADM

## 2025-04-02 RX ORDER — HEPARIN SODIUM,PORCINE/D5W 25000/250
0-40 INTRAVENOUS SOLUTION INTRAVENOUS CONTINUOUS
Status: DISCONTINUED | OUTPATIENT
Start: 2025-04-02 | End: 2025-04-03

## 2025-04-02 RX ORDER — NAPROXEN SODIUM 220 MG/1
81 TABLET, FILM COATED ORAL
Status: COMPLETED | OUTPATIENT
Start: 2025-04-02 | End: 2025-04-02

## 2025-04-02 RX ADMIN — ASPIRIN 81 MG CHEWABLE TABLET 81 MG: 81 TABLET CHEWABLE at 06:04

## 2025-04-02 RX ADMIN — LORAZEPAM 0.5 MG: 2 INJECTION INTRAMUSCULAR; INTRAVENOUS at 06:04

## 2025-04-02 RX ADMIN — HEPARIN SODIUM 12 UNITS/KG/HR: 10000 INJECTION, SOLUTION INTRAVENOUS at 06:04

## 2025-04-02 RX ADMIN — ACETAMINOPHEN 1000 MG: 500 TABLET ORAL at 06:04

## 2025-04-02 NOTE — FIRST PROVIDER EVALUATION
Medical screening examination initiated.  I have conducted a focused provider triage encounter, findings are as follows:    Brief history of present illness:  89-year-old female with complaint of worsening weakness over the past couple of days and chest pressure for 1 day.    Vitals:    04/02/25 1245 04/02/25 1246   Pulse:  (!) 50   Resp: 20    Temp: 98.2 °F (36.8 °C)    TempSrc: Oral    SpO2:  100%   Weight: 65.8 kg (145 lb)            Preliminary workup initiated; this workup will be continued and followed by the physician or advanced practice provider that is assigned to the patient when roomed.

## 2025-04-02 NOTE — Clinical Note
65 ml of contrast were injected throughout the case. 0 mL of contrast was the total wasted during the case. 65 mL was the total amount used during the case.

## 2025-04-02 NOTE — ED PROVIDER NOTES
"SCRIBE #1 NOTE: I, Fabio Machado, am scribing for, and in the presence of, Ervin Jensen Jr., MD. I have scribed the entire note.       History     Chief Complaint   Patient presents with    Chest Pain     "Heaviness" to mid-sternal chest since this am. + SOB.      Review of patient's allergies indicates:   Allergen Reactions    Codeine      "stomach cramps"    Morphine          History of Present Illness     HPI    2025, 6:09 PM  History obtained from the patient, medical records, and daughter      History of Present Illness: Lali Floyd is a 89 y.o. female patient with a PMHx of arthritis, CAD, HTN, 2 heart stents, T2DM, MI in , stage 3 CKD, and cancer who presents to the Emergency Department for evaluation of chest tightness which began this morning at 8:00 AM. Symptoms are constant and moderate in severity. No mitigating or exacerbating factors reported. Associated sxs include cough, chest heaviness, lower back pain, feeling anxious, and SOB. Patient denies any fever or myalgia. Pt is complaint with her medications and takes Plavix daily. Pt sees Dr. Dempsey as her Cardiologist. No prior Tx specified.  No further complaints or concerns at this time.       Arrival mode: Personal Transportation    PCP: Blayne Santos MD        Past Medical History:  Past Medical History:   Diagnosis Date    Arthritis     Cancer     Coronary artery disease     Depression     Diabetes mellitus     History of heart artery stent     Hypertension     MI (myocardial infarction) 2005    Renal disorder     Thyroid disease        Past Surgical History:  Past Surgical History:   Procedure Laterality Date    APPENDECTOMY       SECTION      CHOLECYSTECTOMY      HYSTERECTOMY           Family History:  No family history on file.    Social History:  Social History     Tobacco Use    Smoking status: Never    Smokeless tobacco: Not on file   Substance and Sexual Activity    Alcohol use: Not Currently    Drug use: Never " "   Sexual activity: Yes        Review of Systems     Review of Systems   Constitutional:  Negative for fever.   HENT:  Negative for sore throat.    Respiratory:  Positive for cough, chest tightness and shortness of breath.    Cardiovascular:  Positive for chest pain ("heaviness").   Gastrointestinal:  Negative for nausea.   Genitourinary:  Negative for dysuria.   Musculoskeletal:  Positive for back pain (lower). Negative for myalgias.   Skin:  Negative for rash.   Neurological:  Negative for weakness.   Hematological:  Does not bruise/bleed easily.   Psychiatric/Behavioral:  The patient is nervous/anxious.    All other systems reviewed and are negative.       Physical Exam     Initial Vitals   BP Pulse Resp Temp SpO2   -- 04/02/25 1246 04/02/25 1245 04/02/25 1245 04/02/25 1246    (!) 50 20 98.2 °F (36.8 °C) 100 %      MAP       --                 Physical Exam  Nursing Notes and Vital Signs Reviewed.  Constitutional: Patient is in no apparent distress but is anxious. Well-developed and well-nourished.  Head: Atraumatic. Normocephalic.  Eyes: PERRL. EOM intact. Conjunctivae are not pale. No scleral icterus.  ENT: Mucous membranes are . Oropharynx is clear and symmetric.    Neck: Supple. Full ROM. No lymphadenopathy.  Cardiovascular: Regular rate. Regular rhythm. No murmurs, rubs, or gallops. Distal pulses are 2+ and symmetric.  Pulmonary/Chest: No respiratory distress. Clear to auscultation bilaterally. No wheezing or rales.  Abdominal: Soft and non-distended.  There is no tenderness.  No rebound, guarding, or rigidity. Good bowel sounds.  Genitourinary: No CVA tenderness.  Musculoskeletal: Moves all extremities. No obvious deformities. No edema. No calf tenderness.  Skin: Warm and dry.  Neurological:  Alert, awake, and appropriate.  Normal speech.  No acute focal neurological deficits are appreciated.  Psychiatric: Normal affect. Good eye contact. Appropriate in content.     ED Course   Procedures  ED Vital " Signs:  Vitals:    04/02/25 1245 04/02/25 1246   Pulse:  (!) 50   Resp: 20    Temp: 98.2 °F (36.8 °C)    TempSrc: Oral    SpO2:  100%   Weight: 65.8 kg (145 lb)        Abnormal Lab Results:  Labs Reviewed   COMPREHENSIVE METABOLIC PANEL - Abnormal       Result Value    Sodium 140      Potassium 4.5      Chloride 109      CO2 21 (*)     Glucose 75      BUN 46 (*)     Creatinine 1.6 (*)     Calcium 10.7 (*)     Protein Total 7.7      Albumin 3.9      Bilirubin Total 0.4            AST 20      ALT 20      Anion Gap 10      eGFR 31 (*)    TROPONIN I - Abnormal    Troponin-I 0.244 (*)    URINALYSIS, REFLEX TO URINE CULTURE - Abnormal    Color, UA Colorless (*)     Appearance, UA Clear      pH, UA 6.0      Spec Grav UA 1.010      Protein, UA Negative      Glucose, UA Negative      Ketones, UA Negative      Bilirubin, UA Negative      Blood, UA Negative      Nitrites, UA Negative      Urobilinogen, UA Negative      Leukocyte Esterase, UA 1+ (*)    CBC WITH DIFFERENTIAL - Abnormal    WBC 8.83      RBC 3.98 (*)     HGB 11.1 (*)     HCT 34.3 (*)     MCV 86      MCH 27.9      MCHC 32.4      RDW 12.6      Platelet Count 289      MPV 9.8      Nucleated RBC 0      Neut % 80.5 (*)     Lymph % 13.1 (*)     Mono % 5.5      Eos % 0.1      Basophil % 0.1      Imm Grans % 0.7 (*)     Neut # 7.10      Lymph # 1.16      Mono # 0.49      Eos # 0.01      Baso # 0.01      Imm Grans # 0.06 (*)    URINALYSIS MICROSCOPIC - Abnormal    WBC, UA 3      Bacteria, UA Rare      Hyaline Casts, UA 2 (*)     Microscopic Comment       CK - Normal         CBC W/ AUTO DIFFERENTIAL    Narrative:     The following orders were created for panel order CBC auto differential.  Procedure                               Abnormality         Status                     ---------                               -----------         ------                     CBC with Differential[[id number]]       Abnormal            Final result                 Please view  results for these tests on the individual orders.   TROPONIN I        All Lab Results:  Results for orders placed or performed during the hospital encounter of 04/02/25   Urinalysis, Reflex to Urine Culture Urine, Clean Catch    Collection Time: 04/02/25  3:10 PM    Specimen: Urine   Result Value Ref Range    Color, UA Colorless (A) Straw, Keke, Yellow, Light-Orange    Appearance, UA Clear Clear    pH, UA 6.0 5.0 - 8.0    Spec Grav UA 1.010 1.005 - 1.030    Protein, UA Negative Negative    Glucose, UA Negative Negative    Ketones, UA Negative Negative    Bilirubin, UA Negative Negative    Blood, UA Negative Negative    Nitrites, UA Negative Negative    Urobilinogen, UA Negative <2.0 EU/dL    Leukocyte Esterase, UA 1+ (A) Negative   Urinalysis Microscopic    Collection Time: 04/02/25  3:10 PM   Result Value Ref Range    WBC, UA 3 0 - 5 /HPF    Bacteria, UA Rare None, Rare, Occasional /HPF    Hyaline Casts, UA 2 (H) 0 - 1 /LPF    Microscopic Comment     Comprehensive metabolic panel    Collection Time: 04/02/25  3:16 PM   Result Value Ref Range    Sodium 140 136 - 145 mmol/L    Potassium 4.5 3.5 - 5.1 mmol/L    Chloride 109 95 - 110 mmol/L    CO2 21 (L) 23 - 29 mmol/L    Glucose 75 70 - 110 mg/dL    BUN 46 (H) 8 - 23 mg/dL    Creatinine 1.6 (H) 0.5 - 1.4 mg/dL    Calcium 10.7 (H) 8.7 - 10.5 mg/dL    Protein Total 7.7 6.0 - 8.4 gm/dL    Albumin 3.9 3.5 - 5.2 g/dL    Bilirubin Total 0.4 0.1 - 1.0 mg/dL     40 - 150 unit/L    AST 20 11 - 45 unit/L    ALT 20 10 - 44 unit/L    Anion Gap 10 8 - 16 mmol/L    eGFR 31 (L) >60 mL/min/1.73/m2   CPK    Collection Time: 04/02/25  3:16 PM   Result Value Ref Range     20 - 180 U/L   Troponin I    Collection Time: 04/02/25  3:16 PM   Result Value Ref Range    Troponin-I 0.244 (H) <=0.026 ng/mL   CBC with Differential    Collection Time: 04/02/25  3:16 PM   Result Value Ref Range    WBC 8.83 3.90 - 12.70 K/uL    RBC 3.98 (L) 4.00 - 5.40 M/uL    HGB 11.1 (L) 12.0 - 16.0  gm/dL    HCT 34.3 (L) 37.0 - 48.5 %    MCV 86 82 - 98 fL    MCH 27.9 27.0 - 31.0 pg    MCHC 32.4 32.0 - 36.0 g/dL    RDW 12.6 11.5 - 14.5 %    Platelet Count 289 150 - 450 K/uL    MPV 9.8 9.2 - 12.9 fL    Nucleated RBC 0 <=0 /100 WBC    Neut % 80.5 (H) 38 - 73 %    Lymph % 13.1 (L) 18 - 48 %    Mono % 5.5 4 - 15 %    Eos % 0.1 <=8 %    Basophil % 0.1 <=1.9 %    Imm Grans % 0.7 (H) 0.0 - 0.5 %    Neut # 7.10 1.8 - 7.7 K/uL    Lymph # 1.16 1 - 4.8 K/uL    Mono # 0.49 0.3 - 1 K/uL    Eos # 0.01 <=0.5 K/uL    Baso # 0.01 <=0.2 K/uL    Imm Grans # 0.06 (H) 0.00 - 0.04 K/uL       Imaging Results:  Imaging Results              X-Ray Chest 1 View (Final result)  Result time 04/02/25 14:54:15      Final result by [facility] MD (04/02/25 14:54:15)                   Impression:      No infiltrates      Electronically signed by: Cherry Harp MD  Date:    04/02/2025  Time:    14:54               Narrative:    EXAMINATION:  XR CHEST 1 VIEW    CLINICAL HISTORY:  Shortness of breath,    COMPARISON:  Chest, 03/29/2025    FINDINGS:  One view.  Stable heart size.  The lung fields are clear. No acute cardiopulmonary infiltrate.                                       The EKG was ordered, reviewed, and independently interpreted by the ED provider.  Interpretation time: 12:45  Rate: 48 BPM  Rhythm: sinus bradycardia  Interpretation: Low voltage QRS. No STEMI.         The Emergency Provider reviewed the vital signs and test results, which are outlined above.     ED Discussion     6:27 PM: Discussed pt's case with Dr. Javier (Cardiology) who recommends adding heparin gtt.    6:28 PM: Discussed case with Orestes Borrego NP (Hospital Medicine). Dr. Damon agrees with current care and management of pt and accepts admission.   Admitting Service: Hospital Medicine  Admitting Physician: Dr. Damon  Admit to: Obs/Tele    6:28 PM: Re-evaluated pt.  I have discussed test results, shared treatment plan, and the need for  admission with patient/family/caretaker at bedside. Pt and/or family/caretaker express understanding at this time and agree with all information. All questions answered. Pt/caretaker/family member(s) have no further questions or concerns at this time. Pt is ready for admit.       Medical Decision Making  Amount and/or Complexity of Data Reviewed  Labs: ordered. Decision-making details documented in ED Course.  Radiology: ordered. Decision-making details documented in ED Course.  ECG/medicine tests: ordered and independent interpretation performed. Decision-making details documented in ED Course.    Risk  OTC drugs.  Prescription drug management.                ED Medication(s):  Medications   acetaminophen tablet 1,000 mg (has no administration in time range)   LORazepam injection 0.5 mg (has no administration in time range)       New Prescriptions    No medications on file               Scribe Attestation:   Scribe #1: I performed the above scribed service and the documentation accurately describes the services I performed. I attest to the accuracy of the note.     Attending:   Physician Attestation Statement for Scribe #1: I, Ervin Jensen Jr., MD, personally performed the services described in this documentation, as scribed by Fabio Machado, in my presence, and it is both accurate and complete.           Clinical Impression       ICD-10-CM ICD-9-CM   1. Chest heaviness  R07.89 786.59   2. Weakness  R53.1 780.79   3. SOB (shortness of breath)  R06.02 786.05       Disposition:   Disposition: Placed in Observation  Condition: Stable        Ervin Jensen Jr., MD  04/11/25 2010

## 2025-04-03 PROBLEM — E03.9 HYPOTHYROIDISM: Status: ACTIVE | Noted: 2025-04-03

## 2025-04-03 PROBLEM — N17.9 AKI (ACUTE KIDNEY INJURY): Status: ACTIVE | Noted: 2025-04-03

## 2025-04-03 PROBLEM — E78.5 DYSLIPIDEMIA DUE TO TYPE 2 DIABETES MELLITUS: Status: ACTIVE | Noted: 2025-04-03

## 2025-04-03 PROBLEM — E11.69 DYSLIPIDEMIA DUE TO TYPE 2 DIABETES MELLITUS: Status: ACTIVE | Noted: 2025-04-03

## 2025-04-03 PROBLEM — I21.4 NSTEMI (NON-ST ELEVATED MYOCARDIAL INFARCTION): Status: ACTIVE | Noted: 2025-04-03

## 2025-04-03 PROBLEM — N30.00 ACUTE CYSTITIS: Status: ACTIVE | Noted: 2025-04-03

## 2025-04-03 LAB
ABSOLUTE EOSINOPHIL (OHS): 0.04 K/UL
ABSOLUTE MONOCYTE (OHS): 0.4 K/UL (ref 0.3–1)
ABSOLUTE NEUTROPHIL COUNT (OHS): 3.07 K/UL (ref 1.8–7.7)
ANION GAP (OHS): 7 MMOL/L (ref 8–16)
AORTIC ROOT ANNULUS: 2.66 CM
APTT PPP: 108.9 SECONDS (ref 21–32)
APTT PPP: 33.6 SECONDS (ref 21–32)
ASCENDING AORTA: 2.58 CM
AV INDEX (PROSTH): 0.66
AV MEAN GRADIENT: 4 MMHG
AV PEAK GRADIENT: 8 MMHG
AV VALVE AREA BY VELOCITY RATIO: 1.5 CM²
AV VALVE AREA: 1.7 CM²
AV VELOCITY RATIO: 0.57
BASOPHILS # BLD AUTO: 0.01 K/UL
BASOPHILS NFR BLD AUTO: 0.2 %
BNP SERPL-MCNC: 79 PG/ML (ref 0–99)
BUN SERPL-MCNC: 48 MG/DL (ref 8–23)
CALCIUM SERPL-MCNC: 9.6 MG/DL (ref 8.7–10.5)
CATH EF QUANTITATIVE: 60 %
CHLORIDE SERPL-SCNC: 111 MMOL/L (ref 95–110)
CHOLEST SERPL-MCNC: 150 MG/DL (ref 120–199)
CHOLEST/HDLC SERPL: 2.9 {RATIO} (ref 2–5)
CO2 SERPL-SCNC: 21 MMOL/L (ref 23–29)
CREAT SERPL-MCNC: 1.5 MG/DL (ref 0.5–1.4)
CV ECHO LV RWT: 0.56 CM
DOP CALC AO PEAK VEL: 1.4 M/S
DOP CALC AO VTI: 29.9 CM
DOP CALC LVOT AREA: 2.5 CM2
DOP CALC LVOT DIAMETER: 1.8 CM
DOP CALC LVOT PEAK VEL: 0.8 M/S
DOP CALC LVOT STROKE VOLUME: 49.9 CM3
DOP CALC RVOT PEAK VEL: 0.65 M/S
DOP CALC RVOT VTI: 15.5 CM
DOP CALCLVOT PEAK VEL VTI: 19.6 CM
E WAVE DECELERATION TIME: 191 MSEC
E/A RATIO: 0.88
E/E' RATIO: 10 M/S
EAG (OHS): 140 MG/DL (ref 68–131)
ECHO LV POSTERIOR WALL: 1.1 CM (ref 0.6–1.1)
ERYTHROCYTE [DISTWIDTH] IN BLOOD BY AUTOMATED COUNT: 12.8 % (ref 11.5–14.5)
FRACTIONAL SHORTENING: 30.8 % (ref 28–44)
GFR SERPLBLD CREATININE-BSD FMLA CKD-EPI: 34 ML/MIN/1.73/M2
GLUCOSE SERPL-MCNC: 72 MG/DL (ref 70–110)
HBA1C MFR BLD: 6.5 % (ref 4–5.6)
HCT VFR BLD AUTO: 31.5 % (ref 37–48.5)
HDLC SERPL-MCNC: 51 MG/DL (ref 40–75)
HDLC SERPL: 34 % (ref 20–50)
HGB BLD-MCNC: 10.3 GM/DL (ref 12–16)
IMM GRANULOCYTES # BLD AUTO: 0.04 K/UL (ref 0–0.04)
IMM GRANULOCYTES NFR BLD AUTO: 0.7 % (ref 0–0.5)
INTERVENTRICULAR SEPTUM: 1.1 CM (ref 0.6–1.1)
IVC DIAMETER: 1.97 CM
IVRT: 60 MSEC
LA MAJOR: 4.5 CM
LA MINOR: 4.4 CM
LA WIDTH: 3.3 CM
LDLC SERPL CALC-MCNC: 86.8 MG/DL (ref 63–159)
LEFT ATRIUM SIZE: 3.3 CM
LEFT ATRIUM VOLUME INDEX: 25 ML/M2
LEFT ATRIUM VOLUME: 41 CM3
LEFT INTERNAL DIMENSION IN SYSTOLE: 2.7 CM (ref 2.1–4)
LEFT VENTRICLE DIASTOLIC VOLUME INDEX: 40.36 ML/M2
LEFT VENTRICLE DIASTOLIC VOLUME: 67 ML
LEFT VENTRICLE MASS INDEX: 84.4 G/M2
LEFT VENTRICLE SYSTOLIC VOLUME INDEX: 15.7 ML/M2
LEFT VENTRICLE SYSTOLIC VOLUME: 26 ML
LEFT VENTRICULAR INTERNAL DIMENSION IN DIASTOLE: 3.9 CM (ref 3.5–6)
LEFT VENTRICULAR MASS: 140.1 G
LV LATERAL E/E' RATIO: 8.3 M/S
LV SEPTAL E/E' RATIO: 13.8 M/S
LVED V (TEICH): 66.92 ML
LVES V (TEICH): 25.87 ML
LVOT MG: 1.44 MMHG
LVOT MV: 0.58 CM/S
LYMPHOCYTES # BLD AUTO: 1.79 K/UL (ref 1–4.8)
MCH RBC QN AUTO: 28 PG (ref 27–31)
MCHC RBC AUTO-ENTMCNC: 32.7 G/DL (ref 32–36)
MCV RBC AUTO: 86 FL (ref 82–98)
MV PEAK A VEL: 0.94 M/S
MV PEAK E VEL: 0.83 M/S
MV STENOSIS PRESSURE HALF TIME: 55.49 MS
MV VALVE AREA P 1/2 METHOD: 3.96 CM2
NONHDLC SERPL-MCNC: 99 MG/DL
NUCLEATED RBC (/100WBC) (OHS): 0 /100 WBC
OHS QRS DURATION: 100 MS
OHS QRS DURATION: 92 MS
OHS QTC CALCULATION: 369 MS
OHS QTC CALCULATION: 398 MS
PISA MRMAX VEL: 4.42 M/S
PISA TR MAX VEL: 2 M/S
PLATELET # BLD AUTO: 246 K/UL (ref 150–450)
PMV BLD AUTO: 10.1 FL (ref 9.2–12.9)
POCT GLUCOSE: 80 MG/DL (ref 70–110)
POCT GLUCOSE: 90 MG/DL (ref 70–110)
POTASSIUM SERPL-SCNC: 4 MMOL/L (ref 3.5–5.1)
PV MEAN GRADIENT: 1 MMHG
RA MAJOR: 3.94 CM
RA PRESSURE ESTIMATED: 3 MMHG
RA WIDTH: 2.6 CM
RBC # BLD AUTO: 3.68 M/UL (ref 4–5.4)
RELATIVE EOSINOPHIL (OHS): 0.7 %
RELATIVE LYMPHOCYTE (OHS): 33.5 % (ref 18–48)
RELATIVE MONOCYTE (OHS): 7.5 % (ref 4–15)
RELATIVE NEUTROPHIL (OHS): 57.4 % (ref 38–73)
RV TB RVSP: 5 MMHG
SODIUM SERPL-SCNC: 139 MMOL/L (ref 136–145)
STJ: 2.7 CM
TDI LATERAL: 0.1 M/S
TDI SEPTAL: 0.06 M/S
TDI: 0.08 M/S
TR MAX PG: 16 MMHG
TRICUSPID ANNULAR PLANE SYSTOLIC EXCURSION: 1.88 CM
TRIGL SERPL-MCNC: 61 MG/DL (ref 30–150)
TROPONIN I SERPL DL<=0.01 NG/ML-MCNC: 0.26 NG/ML
TROPONIN I SERPL DL<=0.01 NG/ML-MCNC: 0.3 NG/ML
TV REST PULMONARY ARTERY PRESSURE: 19 MMHG
WBC # BLD AUTO: 5.35 K/UL (ref 3.9–12.7)
Z-SCORE OF LEFT VENTRICULAR DIMENSION IN END DIASTOLE: -1.76
Z-SCORE OF LEFT VENTRICULAR DIMENSION IN END SYSTOLE: -0.51

## 2025-04-03 PROCEDURE — 96375 TX/PRO/DX INJ NEW DRUG ADDON: CPT

## 2025-04-03 PROCEDURE — 84520 ASSAY OF UREA NITROGEN: CPT | Performed by: PHYSICIAN ASSISTANT

## 2025-04-03 PROCEDURE — 85730 THROMBOPLASTIN TIME PARTIAL: CPT | Mod: 91 | Performed by: HOSPITALIST

## 2025-04-03 PROCEDURE — 21400001 HC TELEMETRY ROOM

## 2025-04-03 PROCEDURE — C1760 CLOSURE DEV, VASC: HCPCS | Performed by: INTERNAL MEDICINE

## 2025-04-03 PROCEDURE — 93458 L HRT ARTERY/VENTRICLE ANGIO: CPT | Performed by: INTERNAL MEDICINE

## 2025-04-03 PROCEDURE — 85025 COMPLETE CBC W/AUTO DIFF WBC: CPT | Performed by: EMERGENCY MEDICINE

## 2025-04-03 PROCEDURE — B2111ZZ FLUOROSCOPY OF MULTIPLE CORONARY ARTERIES USING LOW OSMOLAR CONTRAST: ICD-10-PCS | Performed by: INTERNAL MEDICINE

## 2025-04-03 PROCEDURE — 84484 ASSAY OF TROPONIN QUANT: CPT | Mod: 91 | Performed by: NURSE PRACTITIONER

## 2025-04-03 PROCEDURE — C1769 GUIDE WIRE: HCPCS | Performed by: INTERNAL MEDICINE

## 2025-04-03 PROCEDURE — 99152 MOD SED SAME PHYS/QHP 5/>YRS: CPT | Performed by: INTERNAL MEDICINE

## 2025-04-03 PROCEDURE — C1894 INTRO/SHEATH, NON-LASER: HCPCS | Performed by: INTERNAL MEDICINE

## 2025-04-03 PROCEDURE — 63600175 PHARM REV CODE 636 W HCPCS: Performed by: INTERNAL MEDICINE

## 2025-04-03 PROCEDURE — 36415 COLL VENOUS BLD VENIPUNCTURE: CPT | Performed by: HOSPITALIST

## 2025-04-03 PROCEDURE — 83880 ASSAY OF NATRIURETIC PEPTIDE: CPT | Performed by: INTERNAL MEDICINE

## 2025-04-03 PROCEDURE — 25000003 PHARM REV CODE 250: Performed by: INTERNAL MEDICINE

## 2025-04-03 PROCEDURE — 96366 THER/PROPH/DIAG IV INF ADDON: CPT

## 2025-04-03 PROCEDURE — 93458 L HRT ARTERY/VENTRICLE ANGIO: CPT | Mod: 26,,, | Performed by: INTERNAL MEDICINE

## 2025-04-03 PROCEDURE — 25000003 PHARM REV CODE 250: Performed by: NURSE PRACTITIONER

## 2025-04-03 PROCEDURE — 93005 ELECTROCARDIOGRAM TRACING: CPT

## 2025-04-03 PROCEDURE — 99223 1ST HOSP IP/OBS HIGH 75: CPT | Mod: 25,FS,, | Performed by: INTERNAL MEDICINE

## 2025-04-03 PROCEDURE — 4A023N7 MEASUREMENT OF CARDIAC SAMPLING AND PRESSURE, LEFT HEART, PERCUTANEOUS APPROACH: ICD-10-PCS | Performed by: INTERNAL MEDICINE

## 2025-04-03 PROCEDURE — B2151ZZ FLUOROSCOPY OF LEFT HEART USING LOW OSMOLAR CONTRAST: ICD-10-PCS | Performed by: INTERNAL MEDICINE

## 2025-04-03 PROCEDURE — 99152 MOD SED SAME PHYS/QHP 5/>YRS: CPT | Mod: ,,, | Performed by: INTERNAL MEDICINE

## 2025-04-03 PROCEDURE — 25500020 PHARM REV CODE 255: Performed by: INTERNAL MEDICINE

## 2025-04-03 DEVICE — ANGIO-SEAL VIP VASCULAR CLOSURE DEVICE
Type: IMPLANTABLE DEVICE | Site: GROIN | Status: FUNCTIONAL
Brand: ANGIO-SEAL

## 2025-04-03 RX ORDER — DIPHENHYDRAMINE HYDROCHLORIDE 50 MG/ML
INJECTION, SOLUTION INTRAMUSCULAR; INTRAVENOUS
Status: DISCONTINUED | OUTPATIENT
Start: 2025-04-03 | End: 2025-04-03 | Stop reason: HOSPADM

## 2025-04-03 RX ORDER — LIDOCAINE HYDROCHLORIDE 20 MG/ML
INJECTION, SOLUTION EPIDURAL; INFILTRATION; INTRACAUDAL; PERINEURAL
Status: DISCONTINUED | OUTPATIENT
Start: 2025-04-03 | End: 2025-04-03 | Stop reason: HOSPADM

## 2025-04-03 RX ORDER — FENTANYL CITRATE 50 UG/ML
INJECTION, SOLUTION INTRAMUSCULAR; INTRAVENOUS
Status: DISCONTINUED | OUTPATIENT
Start: 2025-04-03 | End: 2025-04-03 | Stop reason: HOSPADM

## 2025-04-03 RX ORDER — LEVOFLOXACIN 250 MG/1
250 TABLET ORAL DAILY
Status: DISCONTINUED | OUTPATIENT
Start: 2025-04-03 | End: 2025-04-04

## 2025-04-03 RX ORDER — ACETAMINOPHEN 325 MG/1
650 TABLET ORAL EVERY 4 HOURS PRN
Status: DISCONTINUED | OUTPATIENT
Start: 2025-04-03 | End: 2025-04-04 | Stop reason: HOSPADM

## 2025-04-03 RX ORDER — SODIUM CHLORIDE 9 MG/ML
INJECTION, SOLUTION INTRAVENOUS CONTINUOUS
Status: DISCONTINUED | OUTPATIENT
Start: 2025-04-03 | End: 2025-04-03

## 2025-04-03 RX ORDER — ONDANSETRON 8 MG/1
8 TABLET, ORALLY DISINTEGRATING ORAL EVERY 8 HOURS PRN
Status: DISCONTINUED | OUTPATIENT
Start: 2025-04-03 | End: 2025-04-04 | Stop reason: HOSPADM

## 2025-04-03 RX ORDER — HYDRALAZINE HYDROCHLORIDE 20 MG/ML
10 INJECTION INTRAMUSCULAR; INTRAVENOUS EVERY 6 HOURS PRN
Status: DISCONTINUED | OUTPATIENT
Start: 2025-04-03 | End: 2025-04-04 | Stop reason: HOSPADM

## 2025-04-03 RX ORDER — LEVOTHYROXINE SODIUM 50 UG/1
50 TABLET ORAL
Status: DISCONTINUED | OUTPATIENT
Start: 2025-04-03 | End: 2025-04-04 | Stop reason: HOSPADM

## 2025-04-03 RX ORDER — CEFAZOLIN SODIUM 1 G/3ML
INJECTION, POWDER, FOR SOLUTION INTRAMUSCULAR; INTRAVENOUS
Status: DISCONTINUED | OUTPATIENT
Start: 2025-04-03 | End: 2025-04-03 | Stop reason: HOSPADM

## 2025-04-03 RX ORDER — SODIUM CHLORIDE 9 MG/ML
INJECTION, SOLUTION INTRAVENOUS CONTINUOUS
Status: DISCONTINUED | OUTPATIENT
Start: 2025-04-03 | End: 2025-04-04

## 2025-04-03 RX ORDER — BENZONATATE 100 MG/1
100 CAPSULE ORAL 3 TIMES DAILY PRN
Status: DISCONTINUED | OUTPATIENT
Start: 2025-04-03 | End: 2025-04-04 | Stop reason: HOSPADM

## 2025-04-03 RX ORDER — MIDAZOLAM HYDROCHLORIDE 1 MG/ML
INJECTION INTRAMUSCULAR; INTRAVENOUS
Status: DISCONTINUED | OUTPATIENT
Start: 2025-04-03 | End: 2025-04-03 | Stop reason: HOSPADM

## 2025-04-03 RX ORDER — ATORVASTATIN CALCIUM 40 MG/1
40 TABLET, FILM COATED ORAL DAILY
Status: DISCONTINUED | OUTPATIENT
Start: 2025-04-03 | End: 2025-04-04 | Stop reason: HOSPADM

## 2025-04-03 RX ORDER — VALSARTAN 40 MG/1
80 TABLET ORAL DAILY
Status: DISCONTINUED | OUTPATIENT
Start: 2025-04-03 | End: 2025-04-04 | Stop reason: HOSPADM

## 2025-04-03 RX ORDER — HYDRALAZINE HYDROCHLORIDE 20 MG/ML
5 INJECTION INTRAMUSCULAR; INTRAVENOUS EVERY 6 HOURS PRN
Status: DISCONTINUED | OUTPATIENT
Start: 2025-04-03 | End: 2025-04-03

## 2025-04-03 RX ADMIN — ATORVASTATIN CALCIUM 40 MG: 40 TABLET, FILM COATED ORAL at 08:04

## 2025-04-03 RX ADMIN — LEVOTHYROXINE SODIUM 50 MCG: 0.05 TABLET ORAL at 05:04

## 2025-04-03 RX ADMIN — BENZONATATE 100 MG: 100 CAPSULE ORAL at 09:04

## 2025-04-03 RX ADMIN — SODIUM CHLORIDE: 9 INJECTION, SOLUTION INTRAVENOUS at 12:04

## 2025-04-03 RX ADMIN — ASPIRIN 81 MG: 81 TABLET, CHEWABLE ORAL at 08:04

## 2025-04-03 RX ADMIN — LEVOFLOXACIN 250 MG: 250 TABLET, FILM COATED ORAL at 08:04

## 2025-04-03 RX ADMIN — VALSARTAN 80 MG: 40 TABLET, FILM COATED ORAL at 08:04

## 2025-04-03 NOTE — PLAN OF CARE
Patient to [facility] without fix. Access by Right Groin with closure device, site benign. Bedrest ordered with HOB flat for 3 hours and NS @ 100 ml/hr for 6 hours.     Patient denies complaints, asymptomatic bradycardia noted.     Grand-daughter with patient at BS. Recovery care complete. Dr Quiroz sent patient daughter contact information.     Report called to primary RN Krystin. Patient transferred to room 228 via bed. Bedside report given as well, Right groin without complicaiton

## 2025-04-03 NOTE — ASSESSMENT & PLAN NOTE
Patient has chronic hypothyroidism. TFTs reviewed-   Lab Results   Component Value Date    TSH 1.05 09/05/2024   . Will continue chronic levothyroxine and adjust for and clinical changes.

## 2025-04-03 NOTE — H&P
"[facility] Medicine  History & Physical    Patient Name: Lali Floyd  MRN: [id number]  Patient Class: OP- Observation  Admission Date: 4/2/2025  Attending Physician: Ang Damon MD   Primary Care Provider: Blayne Santos MD         Patient information was obtained from patient, relative(s), past medical records, and ER records.     Subjective:     Principal Problem:NSTEMI (non-ST elevated myocardial infarction)    Chief Complaint:   Chief Complaint   Patient presents with    Chest Pain     "Heaviness" to mid-sternal chest since this am. + SOB.         HPI: Lali Floyd is a 89 y.o. female with a PMH  has a past medical history of Arthritis, Cancer, Coronary artery disease, Depression, Diabetes mellitus, History of heart artery stent (2005), Hypertension, MI (myocardial infarction) (2005), Renal disorder, and Thyroid disease.  Presented to the ER for evaluation of substernal chest tightness which began around 8:00 a.m. this morning.  Patient reports she was diagnosed with an upper respiratory infection on week ago.  Patient reports she was feeling better on Friday and went to go run errands to have hair done to go to awake on Saturday morning.  Since Sunday patient's been feeling generally weak/fatigued with new onset of chest tightness that began this morning upon waking up.  Patient is followed by Dr. Dempsey with Cardiology and states that she had a mi in 2005 which required 2 stents to be placed.  Was last evaluated by Dr. Dempsey few months ago and was informed that everything looked okay.  Last stress test was performed between 6-10 years ago.  States that her chest tightness that brought her into the ER this evening is not similar to when she had her mi stent placement back in 2005.  Denies any chest pain currently.  Denies any other associated symptoms such as lightheadedness, dizziness, palpitations, shortness of breath, dysuria, hematuria, fever, aches, " "chills, sweats.      ER workup revealed CBC to be unremarkable.  CMP with a BUN/creatinine of 46/1.6 with EGFR of 31.  CPK within normal limits.  Initial troponin 0.244 with repeat troponin 0.271.  Chest x-ray negative for acute findings.  EKG revealed sinus Sudarshan with a ventricular rate of 48 beats per minute with a QT/QTC of 414/369.  UA revealed +1 leukocyte esterase, 3 WBCs, rare bacteria.  Patient received 1 g Tylenol, 81 mg aspirin, 0.5 mg Ativan, and initiated on heparin drip after ER provider spoke with on-call cardiologist.  Patient placed on NSTEMI pathway.  Hospital Medicine consulted to admit patient for NSTEMI and UTI.  Patient in agreement with treatment plan.  Patient admitted under observation status.    PCP: Blayne Santos      Past Medical History:   Diagnosis Date    Arthritis     Cancer     Coronary artery disease     Depression     Diabetes mellitus     History of heart artery stent     Hypertension     MI (myocardial infarction)     Renal disorder     Thyroid disease        Past Surgical History:   Procedure Laterality Date    APPENDECTOMY       SECTION      CHOLECYSTECTOMY      HYSTERECTOMY         Review of patient's allergies indicates:   Allergen Reactions    Codeine      "stomach cramps"    Morphine        No current facility-administered medications on file prior to encounter.     Current Outpatient Medications on File Prior to Encounter   Medication Sig    albuterol (PROVENTIL/VENTOLIN HFA) 90 mcg/actuation inhaler Inhale 2 puffs into the lungs every 6 (six) hours as needed for Wheezing or Shortness of Breath. Rescue    atorvastatin (LIPITOR) 40 MG tablet Take 40 mg by mouth once daily.    clopidogrel (PLAVIX) 75 mg tablet Take 75 mg by mouth.    diazePAM (VALIUM) 2 MG tablet Take 1 tablet (2 mg total) by mouth every 6 (six) hours as needed (muscle spasm).    levoFLOXacin (LEVAQUIN) 250 MG tablet Take 1 tablet (250 mg total) by mouth once daily. for 7 days    " levothyroxine (SYNTHROID) 50 MCG tablet TAKE 1 TABLET EVERY DAY    methylPREDNISolone (MEDROL DOSEPACK) 4 mg tablet use as directed    valsartan (DIOVAN) 80 MG tablet Take 80 mg by mouth.    vitamin D (VITAMIN D3) 1000 units Tab Take 5,000 Units by mouth.    baclofen (LIORESAL) 10 MG tablet Take 1 tablet (10 mg total) by mouth 4 (four) times daily. for 5 days    estradioL (ESTRACE) 0.01 % (0.1 mg/gram) vaginal cream Place 1 g vaginally 3 (three) times a week.    nitroGLYCERIN (NITROSTAT) 0.4 MG SL tablet Place 0.4 mg under the tongue.    tiZANidine (ZANAFLEX) 4 MG tablet Take 4 mg by mouth.     Family History    None       Tobacco Use    Smoking status: Never    Smokeless tobacco: Not on file   Substance and Sexual Activity    Alcohol use: Not Currently    Drug use: Never    Sexual activity: Yes     Review of Systems   Constitutional:  Positive for fatigue. Negative for chills, diaphoresis and fever.   Respiratory:  Negative for cough and shortness of breath.    Cardiovascular:  Positive for chest pain. Negative for palpitations and leg swelling.   Gastrointestinal:  Negative for abdominal pain, diarrhea, nausea and vomiting.   Genitourinary:  Negative for dysuria, flank pain, frequency and hematuria.   Neurological:  Positive for dizziness and light-headedness.   All other systems reviewed and are negative.    Objective:     Vital Signs (Most Recent):  Temp: 97.4 °F (36.3 °C) (04/02/25 2353)  Pulse: (!) 49 (04/03/25 0413)  Resp: 18 (04/02/25 2353)  BP: (!) 141/65 (04/02/25 2353)  SpO2: 97 % (04/02/25 2353) Vital Signs (24h Range):  Temp:  [97.4 °F (36.3 °C)-98.2 °F (36.8 °C)] 97.4 °F (36.3 °C)  Pulse:  [43-52] 49  Resp:  [17-22] 18  SpO2:  [95 %-100 %] 97 %  BP: (141-185)/() 141/65     Weight: 66.8 kg (147 lb 4.3 oz)  Body mass index is 28.76 kg/m².     Physical Exam  Vitals and nursing note reviewed.   Constitutional:       General: She is awake. She is not in acute distress.     Appearance: Normal  appearance. She is well-developed and well-groomed. She is not ill-appearing, toxic-appearing or diaphoretic.   HENT:      Head: Normocephalic and atraumatic.      Mouth/Throat:      Lips: Pink.      Mouth: Mucous membranes are moist.      Pharynx: Oropharynx is clear. Uvula midline.   Eyes:      Extraocular Movements: Extraocular movements intact.      Conjunctiva/sclera: Conjunctivae normal.      Pupils: Pupils are equal, round, and reactive to light.   Cardiovascular:      Rate and Rhythm: Normal rate and regular rhythm.      Heart sounds: Normal heart sounds. No murmur heard.  Pulmonary:      Effort: Pulmonary effort is normal.      Breath sounds: Decreased breath sounds present. No wheezing, rhonchi or rales.   Abdominal:      General: Bowel sounds are normal.      Palpations: Abdomen is soft.      Tenderness: There is no abdominal tenderness. There is no right CVA tenderness, left CVA tenderness, guarding or rebound.   Musculoskeletal:      Cervical back: Normal range of motion and neck supple.      Right lower leg: No edema.      Left lower leg: No edema.      Comments: 5/5 strength throughout   Skin:     General: Skin is warm and dry.      Capillary Refill: Capillary refill takes less than 2 seconds.   Neurological:      General: No focal deficit present.      Mental Status: She is alert and oriented to person, place, and time. Mental status is at baseline.      GCS: GCS eye subscore is 4. GCS verbal subscore is 5. GCS motor subscore is 6.      Cranial Nerves: Cranial nerves 2-12 are intact.      Sensory: Sensation is intact.      Motor: Motor function is intact.   Psychiatric:         Mood and Affect: Mood normal.         Speech: Speech normal.         Behavior: Behavior normal. Behavior is cooperative.              CRANIAL NERVES     CN III, IV, VI   Pupils are equal, round, and reactive to light.     LABS:  Recent Results (from the past 24 hours)   EKG 12-lead    Collection Time: 04/02/25 12:45 PM    Result Value Ref Range    QRS Duration 92 ms    [facility] QTC Calculation 369 ms   ABORH RETYPE    Collection Time: 04/02/25  3:09 PM   Result Value Ref Range    ABORH Retype A POS    Urinalysis, Reflex to Urine Culture Urine, Clean Catch    Collection Time: 04/02/25  3:10 PM    Specimen: Urine   Result Value Ref Range    Color, UA Colorless (A) Straw, Keke, Yellow, Light-Orange    Appearance, UA Clear Clear    pH, UA 6.0 5.0 - 8.0    Spec Grav UA 1.010 1.005 - 1.030    Protein, UA Negative Negative    Glucose, UA Negative Negative    Ketones, UA Negative Negative    Bilirubin, UA Negative Negative    Blood, UA Negative Negative    Nitrites, UA Negative Negative    Urobilinogen, UA Negative <2.0 EU/dL    Leukocyte Esterase, UA 1+ (A) Negative   Urinalysis Microscopic    Collection Time: 04/02/25  3:10 PM   Result Value Ref Range    WBC, UA 3 0 - 5 /HPF    Bacteria, UA Rare None, Rare, Occasional /HPF    Hyaline Casts, UA 2 (H) 0 - 1 /LPF    Microscopic Comment     Comprehensive metabolic panel    Collection Time: 04/02/25  3:16 PM   Result Value Ref Range    Sodium 140 136 - 145 mmol/L    Potassium 4.5 3.5 - 5.1 mmol/L    Chloride 109 95 - 110 mmol/L    CO2 21 (L) 23 - 29 mmol/L    Glucose 75 70 - 110 mg/dL    BUN 46 (H) 8 - 23 mg/dL    Creatinine 1.6 (H) 0.5 - 1.4 mg/dL    Calcium 10.7 (H) 8.7 - 10.5 mg/dL    Protein Total 7.7 6.0 - 8.4 gm/dL    Albumin 3.9 3.5 - 5.2 g/dL    Bilirubin Total 0.4 0.1 - 1.0 mg/dL     40 - 150 unit/L    AST 20 11 - 45 unit/L    ALT 20 10 - 44 unit/L    Anion Gap 10 8 - 16 mmol/L    eGFR 31 (L) >60 mL/min/1.73/m2   CPK    Collection Time: 04/02/25  3:16 PM   Result Value Ref Range     20 - 180 U/L   Troponin I    Collection Time: 04/02/25  3:16 PM   Result Value Ref Range    Troponin-I 0.244 (H) <=0.026 ng/mL   CBC with Differential    Collection Time: 04/02/25  3:16 PM   Result Value Ref Range    WBC 8.83 3.90 - 12.70 K/uL    RBC 3.98 (L) 4.00 - 5.40 M/uL    HGB 11.1 (L)  12.0 - 16.0 gm/dL    HCT 34.3 (L) 37.0 - 48.5 %    MCV 86 82 - 98 fL    MCH 27.9 27.0 - 31.0 pg    MCHC 32.4 32.0 - 36.0 g/dL    RDW 12.6 11.5 - 14.5 %    Platelet Count 289 150 - 450 K/uL    MPV 9.8 9.2 - 12.9 fL    Nucleated RBC 0 <=0 /100 WBC    Neut % 80.5 (H) 38 - 73 %    Lymph % 13.1 (L) 18 - 48 %    Mono % 5.5 4 - 15 %    Eos % 0.1 <=8 %    Basophil % 0.1 <=1.9 %    Imm Grans % 0.7 (H) 0.0 - 0.5 %    Neut # 7.10 1.8 - 7.7 K/uL    Lymph # 1.16 1 - 4.8 K/uL    Mono # 0.49 0.3 - 1 K/uL    Eos # 0.01 <=0.5 K/uL    Baso # 0.01 <=0.2 K/uL    Imm Grans # 0.06 (H) 0.00 - 0.04 K/uL   Troponin I    Collection Time: 04/02/25  6:42 PM   Result Value Ref Range    Troponin-I 0.271 (H) <=0.026 ng/mL   APTT    Collection Time: 04/02/25  6:42 PM   Result Value Ref Range    PTT 21.7 21.0 - 32.0 seconds   Protime-INR    Collection Time: 04/02/25  6:42 PM   Result Value Ref Range    PT 11.5 9.0 - 12.5 seconds    INR 1.0 0.8 - 1.2   Lipid panel    Collection Time: 04/02/25  9:20 PM   Result Value Ref Range    Cholesterol Total 150 120 - 199 mg/dL    Triglyceride 61 30 - 150 mg/dL    HDL Cholesterol 51 40 - 75 mg/dL    LDL Cholesterol 86.8 63.0 - 159.0 mg/dL    HDL/Cholesterol Ratio 34.0 20.0 - 50.0 %    Cholesterol/HDL Ratio 2.9 2.0 - 5.0    Non HDL Cholesterol 99 mg/dL   Hemoglobin A1c    Collection Time: 04/02/25  9:20 PM   Result Value Ref Range    Hemoglobin A1c 6.5 (H) 4.0 - 5.6 %    Estimated Average Glucose 140 (H) 68 - 131 mg/dL   Troponin I    Collection Time: 04/02/25  9:20 PM   Result Value Ref Range    Troponin-I 0.302 (H) <=0.026 ng/mL   Type & Screen    Collection Time: 04/02/25  9:20 PM   Result Value Ref Range    Specimen Outdate 04/05/2025 23:59     Group & Rh A POS     Indirect Vicente NEG    APTT    Collection Time: 04/03/25  1:21 AM   Result Value Ref Range    .9 (H) 21.0 - 32.0 seconds   Troponin I    Collection Time: 04/03/25  1:21 AM   Result Value Ref Range    Troponin-I 0.298 (H) <=0.026 ng/mL        RADIOLOGY  X-Ray Chest 1 View  Result Date: 4/2/2025  EXAMINATION: XR CHEST 1 VIEW CLINICAL HISTORY: Shortness of breath, COMPARISON: Chest, 03/29/2025 FINDINGS: One view.  Stable heart size.  The lung fields are clear. No acute cardiopulmonary infiltrate.     No infiltrates Electronically signed by: Cherry Harp MD Date:    04/02/2025 Time:    14:54    CT Chest Abdomen Pelvis Without Contrast (XPD)  Result Date: 3/29/2025  EXAM: CT CHEST ABDOMEN PELVIS WITHOUT CONTRAST(XPD) CLINICAL HISTORY: Chest and back pain, acute COMPARISON: None TECHNIQUE: Standard CT thin section axial images of the chest abdomen and pelvis with reformatted sagittal and coronal images. FINDINGS:   Right basilar endobronchial filling material.  Lungs otherwise clear.  No pleural effusion, pleural thickening, or pneumothorax.  No pathologically enlarged mediastinal or hilar lymph nodes are present.  Heart size is within normal limits.  There is no significant pericardial effusion.  No sign of aortic dissection or aortic aneurysm. Moderate LAD coronary artery calcification, calcium, or atherosclerosis is present. The liver, spleen, and adrenal glands are not unusual in contrast-enhanced CT appearance. Pancreatic cystic lesions and calcifications which could relate to cirrhosis and chronic pancreatitis but the cystic lesions are not fully characterized.  Recommend outpatient MRCP for better characterization. Left renal simple cyst and nephrolithiasis.  Otherwise, the kidneys, ureters, and bladder are unremarkable. No evidence of hydronephrosis. Gallbladder surgically absent.  Bile ducts are within normal limits. The stomach, small bowel, and colon are unremarkable.  No evidence of bowel obstruction or acute inflammatory process. No free fluid, free air, or abscess.  The appendix is not identified. Small hiatal hernia. Mild atelectatic atherosclerosis.  No aneurysm.  No pathologically enlarged lymph nodes. Status post hysterectomy. No  acute or suspicious osseous lesion is evident.  Osseous degenerative changes.      No definite acute abnormality. Small hiatal hernia. Indeterminate pancreatic cystic lesions for which further characterization with outpatient MRCP is recommended. Right basilar endobronchial filling material of unclear significance. Complete findings as above. All CT scans at [this location] are performed using dose modulation techniques as appropriate to a performed exam including the following: automated exposure control; adjustment of the mA and/or kV according to patient size (this includes techniques or standardized protocols for targeted exams where dose is matched to indication / reason for exam; i.e. extremities or head); use of iterative reconstruction technique. Finalized on: 3/29/2025 11:04 PM By:  Delano Gonzalez MD [facility]# [id number]      2025-03-29 23:06:16.450     [facility]    X-Ray Chest AP Portable  Result Date: 3/29/2025  EXAM: XR CHEST AP PORTABLE CLINICAL HISTORY: Cough FINDINGS:  Clear lungs.  No pleural effusion or pneumothorax or pulmonary edema.  Normal heart size.  Calcific plaque seen in the aortic arch.      No acute cardiopulmonary process. Finalized on: 3/29/2025 8:59 PM By:  Hakeem Sen MD [facility]# [id number]      2025-03-29 21:01:09.489     [facility]      EKG    MICROBIOLOGY    MDM     Amount and/or Complexity of Data Reviewed  Clinical lab tests: reviewed  Tests in the radiology section of CPT®: reviewed  Tests in the medicine section of CPT®: reviewed  Discussion of test results with the performing providers: yes  Decide to obtain previous medical records or to obtain history from someone other than the patient: yes  Obtain history from someone other than the patient: yes  Review and summarize past medical records: yes  Discuss the patient with other providers: yes  Independent visualization of images, tracings, or specimens: yes        Assessment/Plan:     Assessment & Plan  NSTEMI (non-ST elevated myocardial  infarction)  Patient presents with NSTEMI. Chest pain is currently controlled. HODAN score is 6. Patient is currently on NSTEMI Pathway.    EKG reviewed. Troponins reviewed and results noted-   Recent Labs   Lab 04/03/25  0121   TROPONINI 0.298*     Lipid panel reviewed and shows-     Lab Results   Component Value Date    LDLCALC 58 08/20/2024     Lab Results   Component Value Date    TRIG 61 04/02/2025     Medical management includes; Dual Anti-Platelet therapy, High Intensity Statin, and ACE/ARB Echo has not been performed. Latest ECHO results are as follows- No results found for this or any previous visit.    Consult for cardiac rehab is ordered. Patient counseled on lifestyle modifications- follow a low fat, low cholesterol diet, attempt to lose weight, decrease or avoid alcohol intake, reduce salt in diet and cooking, reduce exposure to stress, and continue current medications. Cardiology is consulted. Plan of care reviewed with cardiology team. Continue to monitor patient closely and adjust therapy as needed.    Acute cystitis  Urinalysis revealed:   Lab Results   Component Value Date    COLORU Colorless (A) 04/02/2025    APPEARANCEUA Clear 04/02/2025    SPECGRAV 1.010 04/02/2025    PHUR 6.0 04/02/2025    PROTEINUA Negative 04/02/2025    GLUCUA Negative 04/02/2025    KETONESU Negative 05/07/2017    NITRITE Negative 04/02/2025    UROBILINOGEN Negative 04/02/2025    BILIRUBINUA Negative 04/02/2025    LEUKOCYTESUR 1+ (A) 04/02/2025    RBCUA 5 (H) 03/29/2025    WBCUA 3 04/02/2025    BACTERIA Rare 04/02/2025    HYALINECASTS 2 (H) 04/02/2025   Diagnosed with UTI on 03/30/2025.  Compliant with levofloxacin 250 mg p.o. daily.  Plan:  -UA culture pending  -Continue Abx    CARLOS (acute kidney injury)  CARLOS is likely due to pre-renal azotemia due to dehydration. Baseline creatinine is  1.6 . Most recent creatinine and eGFR are listed below.  Recent Labs     04/02/25  1516   CREATININE 1.6*   EGFRNORACEVR 31*      Plan  - CARLOS  is  being treated  - Avoid nephrotoxins and renally dose meds for GFR listed above  - Monitor urine output, serial BMP, and adjust therapy as needed    Essential hypertension  Chronic, controlled.  Latest blood pressure and vitals reviewed-   Temp:  [97.4 °F (36.3 °C)-98.2 °F (36.8 °C)]   Pulse:  [43-52]   Resp:  [17-22]   BP: (141-185)/()   SpO2:  [95 %-100 %] .   Home meds for hypertension were reviewed and noted below.   Hypertension Medications              nitroGLYCERIN (NITROSTAT) 0.4 MG SL tablet Place 0.4 mg under the tongue.    valsartan (DIOVAN) 80 MG tablet Take 80 mg by mouth.     While in the hospital, will manage blood pressure as follows; Continue home antihypertensive regimen    Will utilize p.r.n. blood pressure medication only if patient's blood pressure greater than  160/90 and she develops symptoms such as worsening chest pain or shortness of breath.    Hypothyroidism  Patient has chronic hypothyroidism. TFTs reviewed-   Lab Results   Component Value Date    TSH 1.05 09/05/2024   Plan:  -Will continue chronic levothyroxine and adjust for and clinical changes.    Dyslipidemia due to type 2 diabetes mellitus  Patient is chronically on statin.will continue for now. Last Lipid Panel:   Lab Results   Component Value Date    CHOL 150 04/02/2025    HDL 51 04/02/2025    LDLCALC 58 08/20/2024    TRIG 61 04/02/2025    CHOLHDL 34.0 04/02/2025     Plan:  -Continue home medication  -low fat/low calorie diet      VTE Risk Mitigation (From admission, onward)           Ordered     IP VTE HIGH RISK PATIENT  Once         04/02/25 2031     Place sequential compression device  Until discontinued         04/02/25 2031     heparin 25,000 units in dextrose 5% (100 units/ml) IV bolus from bag LOW INTENSITY nomogram - [facility]  As needed (PRN)        Question:  Heparin Infusion Adjustment (DO NOT MODIFY ANSWER)  Answer:  [email or web address]\epic\Images\Pharmacy\HeparinInfusions\heparin LOW INTENSITY nomogram for [facility]  [id number]    04/02/25 1829     heparin 25,000 units in dextrose 5% (100 units/ml) IV bolus from bag LOW INTENSITY nomogram - [facility]  As needed (PRN)        Question:  Heparin Infusion Adjustment (DO NOT MODIFY ANSWER)  Answer:  [email or web address]\epic\Images\Pharmacy\HeparinInfusions\heparin LOW INTENSITY nomogram for [facility] [id number]    04/02/25 1829     heparin 25,000 units in dextrose 5% 250 mL (100 units/mL) infusion LOW INTENSITY nomogram - [facility]  Continuous        Question:  Begin at (units/kg/hr)  Answer:  12 04/02/25 1829                  //Core Measures   -DVT proph: SCDs, heparin drip  -Code status Full    -Surrogate:granddaughter    Components of this note were documented using a voice recognition system and are subject to errors not corrected at the time the document was proof read. Please contact the author for any clarifications.     Matti Damon NP  Department of Hospital Medicine  [facility] - Telemetry ([facility])

## 2025-04-03 NOTE — HPI
Lali Floyd is a 89 y.o. female with a PMH  has a past medical history of Arthritis, Cancer, Coronary artery disease, Depression, Diabetes mellitus, History of heart artery stent (2005), Hypertension, MI (myocardial infarction) (2005), Renal disorder, and Thyroid disease.  Presented to the ER for evaluation of substernal chest tightness which began around 8:00 a.m. this morning.  Patient reports she was diagnosed with an upper respiratory infection on week ago.  Patient reports she was feeling better on Friday and went to go run errands to have hair done to go to awake on Saturday morning.  Since Sunday patient's been feeling generally weak/fatigued with new onset of chest tightness that began this morning upon waking up.  Patient is followed by Dr. Dempsey with Cardiology and states that she had a mi in 2005 which required 2 stents to be placed.  Was last evaluated by Dr. Dempsey few months ago and was informed that everything looked okay.  Last stress test was performed between 6-10 years ago.  States that her chest tightness that brought her into the ER this evening is not similar to when she had her mi stent placement back in 2005.  Denies any chest pain currently.  Denies any other associated symptoms such as lightheadedness, dizziness, palpitations, shortness of breath, dysuria, hematuria, fever, aches, chills, sweats.      ER workup revealed CBC to be unremarkable.  CMP with a BUN/creatinine of 46/1.6 with EGFR of 31.  CPK within normal limits.  Initial troponin 0.244 with repeat troponin 0.271.  Chest x-ray negative for acute findings.  EKG revealed sinus Sudarshan with a ventricular rate of 48 beats per minute with a QT/QTC of 414/369.  UA revealed +1 leukocyte esterase, 3 WBCs, rare bacteria.  Patient received 1 g Tylenol, 81 mg aspirin, 0.5 mg Ativan, and initiated on heparin drip after ER provider spoke with on-call cardiologist.  Patient placed on NSTEMI pathway.  Hospital Medicine consulted to admit  patient for NSTEMI and UTI.  Patient in agreement with treatment plan.  Patient admitted under observation status.    PCP: Blayne Santos

## 2025-04-03 NOTE — CONSULTS
[facility] ([facility])  Cardiology  Consult Note    Patient Name: Lali Floyd  MRN: [id number]  Admission Date: 4/2/2025  Hospital Length of Stay: 0 days  Code Status: Full Code   Attending Provider: Evelyn Enrique,*   Consulting Provider: Laura Davila PA-C  Primary Care Physician: Blayne Santos MD  Principal Problem:NSTEMI (non-ST elevated myocardial infarction)    Patient information was obtained from patient, past medical records, and ER records.     Inpatient consult to Cardiology  Consult performed by: Laura Davila PA-C  Consult ordered by: Ervin Jensen Jr., MD        Subjective:     Chief Complaint:  CP/NSTEMI    HPI:   Ms. Floyd is an 89 year old female patient whose current medical conditions include CAD s/p prior LAD stent in 2007, NIDDM, CKD, HTN, and dyslipidemia who presented to [facility] ED yesterday evening due to substernal chest tightness/pressure that onset around 8 AM. Associated symptoms included fatigue/weakness. Patient denied any associated SOB, fever, chills, palpitations, near syncope, or syncope. Reported she had been diagnosed with a URI a week ago but seemed to have mostly recovered. Initial workup in ED revealed creatinine of 1.6, troponin of 0.244>0.271, +UTI. Patient subsequently admitted for further evaluation and treatment. Cardiology consulted to assist with management. Patient seen and examined today, resting in bed. Feels ok. States chest pain has resolved. Still feels fatigued. Typically does not have CP symptoms. Very active for her age, lives at home alone and still drives. She reports compliance with her medications. Followed on OP basis by outside cardiologist, Dr. Dempsey. Prior treadmill stress test in [facility] last year negative however exercise capacity was reduced. TTE pending.  Troponin 0.244>0.271>0.302>0.298>0.260. EKG reviewed, sinus bradycardia. [facility] planned today by Dr. Quiroz.    Past Medical History:   Diagnosis Date     "Arthritis     Cancer     Coronary artery disease     Depression     Diabetes mellitus     History of heart artery stent     Hypertension     MI (myocardial infarction)     Renal disorder     Thyroid disease        Past Surgical History:   Procedure Laterality Date    APPENDECTOMY       SECTION      CHOLECYSTECTOMY      HYSTERECTOMY         Review of patient's allergies indicates:   Allergen Reactions    Codeine      "stomach cramps"    Morphine        No current facility-administered medications on file prior to encounter.     Current Outpatient Medications on File Prior to Encounter   Medication Sig    albuterol (PROVENTIL/VENTOLIN HFA) 90 mcg/actuation inhaler Inhale 2 puffs into the lungs every 6 (six) hours as needed for Wheezing or Shortness of Breath. Rescue    atorvastatin (LIPITOR) 40 MG tablet Take 40 mg by mouth once daily.    clopidogrel (PLAVIX) 75 mg tablet Take 75 mg by mouth.    diazePAM (VALIUM) 2 MG tablet Take 1 tablet (2 mg total) by mouth every 6 (six) hours as needed (muscle spasm).    levoFLOXacin (LEVAQUIN) 250 MG tablet Take 1 tablet (250 mg total) by mouth once daily. for 7 days    levothyroxine (SYNTHROID) 50 MCG tablet TAKE 1 TABLET EVERY DAY    methylPREDNISolone (MEDROL DOSEPACK) 4 mg tablet use as directed    valsartan (DIOVAN) 80 MG tablet Take 80 mg by mouth.    vitamin D (VITAMIN D3) 1000 units Tab Take 5,000 Units by mouth.    baclofen (LIORESAL) 10 MG tablet Take 1 tablet (10 mg total) by mouth 4 (four) times daily. for 5 days    estradioL (ESTRACE) 0.01 % (0.1 mg/gram) vaginal cream Place 1 g vaginally 3 (three) times a week.    nitroGLYCERIN (NITROSTAT) 0.4 MG SL tablet Place 0.4 mg under the tongue.    tiZANidine (ZANAFLEX) 4 MG tablet Take 4 mg by mouth.     Family History    None       Tobacco Use    Smoking status: Never    Smokeless tobacco: Not on file   Substance and Sexual Activity    Alcohol use: Not Currently    Drug use: Never    Sexual activity: Yes "     Review of Systems   Constitutional: Positive for malaise/fatigue.   HENT: Negative.     Eyes: Negative.    Cardiovascular:  Positive for chest pain.   Respiratory: Negative.     Endocrine: Negative.    Hematologic/Lymphatic: Negative.    Skin: Negative.    Musculoskeletal:  Positive for arthritis and joint pain.   Gastrointestinal: Negative.    Genitourinary: Negative.    Neurological:  Positive for weakness.   Psychiatric/Behavioral: Negative.     Allergic/Immunologic: Negative.      Objective:     Vital Signs (Most Recent):  Temp: 97.6 °F (36.4 °C) (04/03/25 0823)  Pulse: (!) 54 (04/03/25 0823)  Resp: 16 (04/03/25 0823)  BP: (!) 142/65 (04/03/25 0823)  SpO2: 98 % (04/03/25 0823) Vital Signs (24h Range):  Temp:  [97.4 °F (36.3 °C)-98.2 °F (36.8 °C)] 97.6 °F (36.4 °C)  Pulse:  [43-54] 54  Resp:  [16-22] 16  SpO2:  [95 %-100 %] 98 %  BP: (135-185)/() 142/65     Weight: 69 kg (152 lb 1.9 oz)  Body mass index is 29.71 kg/m².    SpO2: 98 %       No intake or output data in the 24 hours ending 04/03/25 0910    Lines/Drains/Airways       Peripheral Intravenous Line  Duration                  Peripheral IV - Single Lumen 04/02/25 1810 20 G Anterior;Left;Proximal Forearm <1 day                     Physical Exam  Vitals and nursing note reviewed.   Constitutional:       General: She is not in acute distress.     Appearance: Normal appearance. She is well-developed. She is not diaphoretic.   HENT:      Head: Normocephalic and atraumatic.   Eyes:      General:         Right eye: No discharge.         Left eye: No discharge.      Pupils: Pupils are equal, round, and reactive to light.   Cardiovascular:      Rate and Rhythm: Regular rhythm. Bradycardia present.      Heart sounds: Normal heart sounds, S1 normal and S2 normal. No murmur heard.  Pulmonary:      Effort: Pulmonary effort is normal. No respiratory distress.      Breath sounds: Normal breath sounds.   Abdominal:      General: There is no distension.  "  Musculoskeletal:      Right lower leg: No edema.      Left lower leg: No edema.   Skin:     General: Skin is warm and dry.      Findings: No erythema.   Neurological:      Mental Status: She is alert and oriented to person, place, and time.   Psychiatric:         Mood and Affect: Mood normal.         Behavior: Behavior normal.         Thought Content: Thought content normal.        Significant Labs: CMP   Recent Labs   Lab 04/02/25  1516      K 4.5      CO2 21*   BUN 46*   CREATININE 1.6*   CALCIUM 10.7*   ALBUMIN 3.9   BILITOT 0.4   ALKPHOS 143   AST 20   ALT 20   ANIONGAP 10   , CBC   Recent Labs   Lab 04/02/25  1516 04/03/25  0442   WBC 8.83 5.35   HGB 11.1* 10.3*   HCT 34.3* 31.5*    246   , Troponin No results for input(s): "TROPONINIHS" in the last 48 hours., and All pertinent lab results from the last 24 hours have been reviewed.    Significant Imaging: Echocardiogram: Transthoracic echo (TTE) complete (Cupid Only): No results found for this or any previous visit. and EKG: Reviewed  Assessment and Plan:   Patient with history of CAD s/p remote stenting who presents with CP/NSTEMI. Stable this AM. Continue OMT. C today by Dr. Quiroz. Further rec's to follow.    * NSTEMI (non-ST elevated myocardial infarction)  -Patient with history of CAD s/p remote LAD stenting who presents with substernal chest tightness/pressure--->NSTEMI  -Troponin 0.244>0.271>0.302>0.298>0.260  -Continue Plavix, ARB, statin  -No BB given bradycardia  -Continue heparin gtt  -TTE pending  -[facility] today by Dr. Quiroz    CARLOS (acute kidney injury)  -Baseline CKD, creatinine 1.5 this AM  -Will hydrate prior to cath    Essential hypertension  -Titrate medications    Dyslipidemia due to type 2 diabetes mellitus  -Statin    Acute cystitis  -On abx        VTE Risk Mitigation (From admission, onward)           Ordered     IP VTE HIGH RISK PATIENT  Once         04/02/25 2031     Place sequential compression device  Until " discontinued         04/02/25 2031     heparin 25,000 units in dextrose 5% (100 units/ml) IV bolus from bag LOW INTENSITY nomogram - [facility]  As needed (PRN)        Question:  Heparin Infusion Adjustment (DO NOT MODIFY ANSWER)  Answer:  [email or web address]\epic\Images\Pharmacy\HeparinInfusions\heparin LOW INTENSITY nomogram for [facility] [id number].pdf    04/02/25 1829     heparin 25,000 units in dextrose 5% (100 units/ml) IV bolus from bag LOW INTENSITY nomogram - [facility]  As needed (PRN)        Question:  Heparin Infusion Adjustment (DO NOT MODIFY ANSWER)  Answer:  [email or web address]\epic\Images\Pharmacy\HeparinInfusions\heparin LOW INTENSITY nomogram for [facility] [id number].pdf    04/02/25 1829     heparin 25,000 units in dextrose 5% 250 mL (100 units/mL) infusion LOW INTENSITY nomogram - [facility]  Continuous        Question:  Begin at (units/kg/hr)  Answer:  12    04/02/25 1829                    Thank you for your consult. I will follow-up with patient. Please contact us if you have any additional questions.    Laura Davila PA-C  Cardiology   [facility] ([facility])

## 2025-04-03 NOTE — ASSESSMENT & PLAN NOTE
Patient has chronic hypothyroidism. TFTs reviewed-   Lab Results   Component Value Date    TSH 1.05 09/05/2024   Plan:  -Will continue chronic levothyroxine and adjust for and clinical changes.

## 2025-04-03 NOTE — ASSESSMENT & PLAN NOTE
Patient is chronically on statin.will continue for now. Last Lipid Panel:   Lab Results   Component Value Date    CHOL 150 04/02/2025    HDL 51 04/02/2025    LDLCALC 58 08/20/2024    TRIG 61 04/02/2025    CHOLHDL 34.0 04/02/2025     Plan:  -Continue home medication  -low fat/low calorie diet

## 2025-04-03 NOTE — ASSESSMENT & PLAN NOTE
-Patient with history of CAD s/p remote LAD stenting who presents with substernal chest tightness/pressure--->NSTEMI  -Troponin 0.244>0.271>0.302>0.298>0.260  -Continue Plavix, ARB, statin  -No BB given bradycardia  -Continue heparin gtt  -TTE pending  -[facility] today by Dr. Quiroz

## 2025-04-03 NOTE — ASSESSMENT & PLAN NOTE
CARLOS is likely due to pre-renal azotemia due to dehydration. Baseline creatinine is 1.6. Most recent creatinine and eGFR are listed below.  Recent Labs     04/02/25  1516 04/03/25  0442   CREATININE 1.6* 1.5*   EGFRNORACEVR 31* 34*      Plan  - CARLOS is being treated  - Avoid nephrotoxins and renally dose meds for GFR listed above  - Monitor urine output, serial BMP, and adjust therapy as needed

## 2025-04-03 NOTE — PLAN OF CARE
POC reviewed with pt. Pt verbalizes understanding of POC. No questions at this time.  AAOx4. NADN.  Room air.   SB on cardiac monitor 8610.  Pt remains free of falls. Patient wearing non-skid footwear when out of bed.  No complaints at this time.  Safety measures in place. Will continue to monitor.  Informed pt to call for assistance before getting up. Pt verbalizes understanding.  Hourly rounding and chart check complete.   Advanced to heart healthy diet.  Left heart cath performed, no fixes.    Problem: Adult Inpatient Plan of Care  Goal: Plan of Care Review  Outcome: Progressing  Goal: Patient-Specific Goal (Individualized)  Outcome: Progressing  Goal: Absence of Hospital-Acquired Illness or Injury  Outcome: Progressing  Goal: Optimal Comfort and Wellbeing  Outcome: Progressing  Goal: Readiness for Transition of Care  Outcome: Progressing     Problem: Acute Coronary Syndrome  Goal: Optimal Adaptation to Illness  Outcome: Progressing  Goal: Absence of Cardiac-Related Pain  Outcome: Progressing  Goal: Normalized Cardiac Rhythm  Outcome: Progressing  Goal: Effective Cardiac Pump Function  Outcome: Progressing  Goal: Adequate Tissue Perfusion  Outcome: Progressing     Problem: Cardiac Catheterization (Diagnostic/Interventional)  Goal: Absence of Bleeding  Outcome: Progressing  Goal: Absence of Contrast-Induced Injury  Outcome: Progressing  Goal: Stable Heart Rate and Rhythm  Outcome: Progressing  Goal: Absence of Embolism Signs and Symptoms  Outcome: Progressing  Goal: Anesthesia/Sedation Recovery  Outcome: Progressing  Goal: Optimal Pain Control and Function  Outcome: Progressing  Goal: Absence of Vascular Access Complication  Outcome: Progressing     Problem: Fall Injury Risk  Goal: Absence of Fall and Fall-Related Injury  Outcome: Progressing     Problem: Skin Injury Risk Increased  Goal: Skin Health and Integrity  Outcome: Progressing     Problem: Acute Kidney Injury/Impairment  Goal: Fluid and Electrolyte  Balance  Outcome: Progressing  Goal: Improved Oral Intake  Outcome: Progressing  Goal: Effective Renal Function  Outcome: Progressing     Problem: Diabetes Comorbidity  Goal: Blood Glucose Level Within Targeted Range  Outcome: Progressing

## 2025-04-03 NOTE — H&P (VIEW-ONLY)
[facility] ([facility])  Cardiology  Consult Note    Patient Name: Lali Floyd  MRN: [id number]  Admission Date: 4/2/2025  Hospital Length of Stay: 0 days  Code Status: Full Code   Attending Provider: Evelyn Enrique,*   Consulting Provider: Laura Davila PA-C  Primary Care Physician: Blayne Santos MD  Principal Problem:NSTEMI (non-ST elevated myocardial infarction)    Patient information was obtained from patient, past medical records, and ER records.     Inpatient consult to Cardiology  Consult performed by: Laura Davila PA-C  Consult ordered by: Ervin Jensen Jr., MD        Subjective:     Chief Complaint:  CP/NSTEMI    HPI:   Ms. Floyd is an 89 year old female patient whose current medical conditions include CAD s/p prior LAD stent in 2007, NIDDM, CKD, HTN, and dyslipidemia who presented to [facility] ED yesterday evening due to substernal chest tightness/pressure that onset around 8 AM. Associated symptoms included fatigue/weakness. Patient denied any associated SOB, fever, chills, palpitations, near syncope, or syncope. Reported she had been diagnosed with a URI a week ago but seemed to have mostly recovered. Initial workup in ED revealed creatinine of 1.6, troponin of 0.244>0.271, +UTI. Patient subsequently admitted for further evaluation and treatment. Cardiology consulted to assist with management. Patient seen and examined today, resting in bed. Feels ok. States chest pain has resolved. Still feels fatigued. Typically does not have CP symptoms. Very active for her age, lives at home alone and still drives. She reports compliance with her medications. Followed on OP basis by outside cardiologist, Dr. Dempsey. Prior treadmill stress test in [facility] Everywhere last year negative however exercise capacity was reduced. TTE pending.  Troponin 0.244>0.271>0.302>0.298>0.260. EKG reviewed, sinus bradycardia. [facility] planned today by Dr. Quiroz.    Past Medical History:   Diagnosis Date     "Arthritis     Cancer     Coronary artery disease     Depression     Diabetes mellitus     History of heart artery stent     Hypertension     MI (myocardial infarction)     Renal disorder     Thyroid disease        Past Surgical History:   Procedure Laterality Date    APPENDECTOMY       SECTION      CHOLECYSTECTOMY      HYSTERECTOMY         Review of patient's allergies indicates:   Allergen Reactions    Codeine      "stomach cramps"    Morphine        No current facility-administered medications on file prior to encounter.     Current Outpatient Medications on File Prior to Encounter   Medication Sig    albuterol (PROVENTIL/VENTOLIN HFA) 90 mcg/actuation inhaler Inhale 2 puffs into the lungs every 6 (six) hours as needed for Wheezing or Shortness of Breath. Rescue    atorvastatin (LIPITOR) 40 MG tablet Take 40 mg by mouth once daily.    clopidogrel (PLAVIX) 75 mg tablet Take 75 mg by mouth.    diazePAM (VALIUM) 2 MG tablet Take 1 tablet (2 mg total) by mouth every 6 (six) hours as needed (muscle spasm).    levoFLOXacin (LEVAQUIN) 250 MG tablet Take 1 tablet (250 mg total) by mouth once daily. for 7 days    levothyroxine (SYNTHROID) 50 MCG tablet TAKE 1 TABLET EVERY DAY    methylPREDNISolone (MEDROL DOSEPACK) 4 mg tablet use as directed    valsartan (DIOVAN) 80 MG tablet Take 80 mg by mouth.    vitamin D (VITAMIN D3) 1000 units Tab Take 5,000 Units by mouth.    baclofen (LIORESAL) 10 MG tablet Take 1 tablet (10 mg total) by mouth 4 (four) times daily. for 5 days    estradioL (ESTRACE) 0.01 % (0.1 mg/gram) vaginal cream Place 1 g vaginally 3 (three) times a week.    nitroGLYCERIN (NITROSTAT) 0.4 MG SL tablet Place 0.4 mg under the tongue.    tiZANidine (ZANAFLEX) 4 MG tablet Take 4 mg by mouth.     Family History    None       Tobacco Use    Smoking status: Never    Smokeless tobacco: Not on file   Substance and Sexual Activity    Alcohol use: Not Currently    Drug use: Never    Sexual activity: Yes "     Review of Systems   Constitutional: Positive for malaise/fatigue.   HENT: Negative.     Eyes: Negative.    Cardiovascular:  Positive for chest pain.   Respiratory: Negative.     Endocrine: Negative.    Hematologic/Lymphatic: Negative.    Skin: Negative.    Musculoskeletal:  Positive for arthritis and joint pain.   Gastrointestinal: Negative.    Genitourinary: Negative.    Neurological:  Positive for weakness.   Psychiatric/Behavioral: Negative.     Allergic/Immunologic: Negative.      Objective:     Vital Signs (Most Recent):  Temp: 97.6 °F (36.4 °C) (04/03/25 0823)  Pulse: (!) 54 (04/03/25 0823)  Resp: 16 (04/03/25 0823)  BP: (!) 142/65 (04/03/25 0823)  SpO2: 98 % (04/03/25 0823) Vital Signs (24h Range):  Temp:  [97.4 °F (36.3 °C)-98.2 °F (36.8 °C)] 97.6 °F (36.4 °C)  Pulse:  [43-54] 54  Resp:  [16-22] 16  SpO2:  [95 %-100 %] 98 %  BP: (135-185)/() 142/65     Weight: 69 kg (152 lb 1.9 oz)  Body mass index is 29.71 kg/m².    SpO2: 98 %       No intake or output data in the 24 hours ending 04/03/25 0910    Lines/Drains/Airways       Peripheral Intravenous Line  Duration                  Peripheral IV - Single Lumen 04/02/25 1810 20 G Anterior;Left;Proximal Forearm <1 day                     Physical Exam  Vitals and nursing note reviewed.   Constitutional:       General: She is not in acute distress.     Appearance: Normal appearance. She is well-developed. She is not diaphoretic.   HENT:      Head: Normocephalic and atraumatic.   Eyes:      General:         Right eye: No discharge.         Left eye: No discharge.      Pupils: Pupils are equal, round, and reactive to light.   Cardiovascular:      Rate and Rhythm: Regular rhythm. Bradycardia present.      Heart sounds: Normal heart sounds, S1 normal and S2 normal. No murmur heard.  Pulmonary:      Effort: Pulmonary effort is normal. No respiratory distress.      Breath sounds: Normal breath sounds.   Abdominal:      General: There is no distension.  "  Musculoskeletal:      Right lower leg: No edema.      Left lower leg: No edema.   Skin:     General: Skin is warm and dry.      Findings: No erythema.   Neurological:      Mental Status: She is alert and oriented to person, place, and time.   Psychiatric:         Mood and Affect: Mood normal.         Behavior: Behavior normal.         Thought Content: Thought content normal.        Significant Labs: CMP   Recent Labs   Lab 04/02/25  1516      K 4.5      CO2 21*   BUN 46*   CREATININE 1.6*   CALCIUM 10.7*   ALBUMIN 3.9   BILITOT 0.4   ALKPHOS 143   AST 20   ALT 20   ANIONGAP 10   , CBC   Recent Labs   Lab 04/02/25  1516 04/03/25  0442   WBC 8.83 5.35   HGB 11.1* 10.3*   HCT 34.3* 31.5*    246   , Troponin No results for input(s): "TROPONINIHS" in the last 48 hours., and All pertinent lab results from the last 24 hours have been reviewed.    Significant Imaging: Echocardiogram: Transthoracic echo (TTE) complete (Cupid Only): No results found for this or any previous visit. and EKG: Reviewed  Assessment and Plan:   Patient with history of CAD s/p remote stenting who presents with CP/NSTEMI. Stable this AM. Continue OMT. C today by Dr. Quiroz. Further rec's to follow.    * NSTEMI (non-ST elevated myocardial infarction)  -Patient with history of CAD s/p remote LAD stenting who presents with substernal chest tightness/pressure--->NSTEMI  -Troponin 0.244>0.271>0.302>0.298>0.260  -Continue Plavix, ARB, statin  -No BB given bradycardia  -Continue heparin gtt  -TTE pending  -[facility] today by Dr. Quiroz    CARLOS (acute kidney injury)  -Baseline CKD, creatinine 1.5 this AM  -Will hydrate prior to cath    Essential hypertension  -Titrate medications    Dyslipidemia due to type 2 diabetes mellitus  -Statin    Acute cystitis  -On abx        VTE Risk Mitigation (From admission, onward)           Ordered     IP VTE HIGH RISK PATIENT  Once         04/02/25 2031     Place sequential compression device  Until " discontinued         04/02/25 2031     heparin 25,000 units in dextrose 5% (100 units/ml) IV bolus from bag LOW INTENSITY nomogram - [facility]  As needed (PRN)        Question:  Heparin Infusion Adjustment (DO NOT MODIFY ANSWER)  Answer:  [email or web address]\epic\Images\Pharmacy\HeparinInfusions\heparin LOW INTENSITY nomogram for [facility] [id number].pdf    04/02/25 1829     heparin 25,000 units in dextrose 5% (100 units/ml) IV bolus from bag LOW INTENSITY nomogram - [facility]  As needed (PRN)        Question:  Heparin Infusion Adjustment (DO NOT MODIFY ANSWER)  Answer:  [email or web address]\epic\Images\Pharmacy\HeparinInfusions\heparin LOW INTENSITY nomogram for [facility] [id number].pdf    04/02/25 1829     heparin 25,000 units in dextrose 5% 250 mL (100 units/mL) infusion LOW INTENSITY nomogram - [facility]  Continuous        Question:  Begin at (units/kg/hr)  Answer:  12    04/02/25 1829                    Thank you for your consult. I will follow-up with patient. Please contact us if you have any additional questions.    Laura Davila PA-C  Cardiology   [facility] ([facility])

## 2025-04-03 NOTE — PROGRESS NOTES
[facility] Medicine  Progress Note    Patient Name: Lali Floyd  MRN: [id number]  Patient Class: IP- Inpatient   Admission Date: 4/2/2025  Length of Stay: 0 days  Attending Physician: Evelyn Enrique,*  Primary Care Provider: Blayne Santos MD        Subjective     Principal Problem:NSTEMI (non-ST elevated myocardial infarction)        HPI:  Lali Floyd is a 89 y.o. female with a PMH  has a past medical history of Arthritis, Cancer, Coronary artery disease, Depression, Diabetes mellitus, History of heart artery stent (2005), Hypertension, MI (myocardial infarction) (2005), Renal disorder, and Thyroid disease.  Presented to the ER for evaluation of substernal chest tightness which began around 8:00 a.m. this morning.  Patient reports she was diagnosed with an upper respiratory infection on week ago.  Patient reports she was feeling better on Friday and went to go run errands to have hair done to go to awake on Saturday morning.  Since Sunday patient's been feeling generally weak/fatigued with new onset of chest tightness that began this morning upon waking up.  Patient is followed by Dr. Dempsey with Cardiology and states that she had a mi in 2005 which required 2 stents to be placed.  Was last evaluated by Dr. Dempsey few months ago and was informed that everything looked okay.  Last stress test was performed between 6-10 years ago.  States that her chest tightness that brought her into the ER this evening is not similar to when she had her mi stent placement back in 2005.  Denies any chest pain currently.  Denies any other associated symptoms such as lightheadedness, dizziness, palpitations, shortness of breath, dysuria, hematuria, fever, aches, chills, sweats.      ER workup revealed CBC to be unremarkable.  CMP with a BUN/creatinine of 46/1.6 with EGFR of 31.  CPK within normal limits.  Initial troponin 0.244 with repeat troponin 0.271.  Chest x-ray negative for acute  findings.  EKG revealed sinus Sudarshan with a ventricular rate of 48 beats per minute with a QT/QTC of 414/369.  UA revealed +1 leukocyte esterase, 3 WBCs, rare bacteria.  Patient received 1 g Tylenol, 81 mg aspirin, 0.5 mg Ativan, and initiated on heparin drip after ER provider spoke with on-call cardiologist.  Patient placed on NSTEMI pathway.  Hospital Medicine consulted to admit patient for NSTEMI and UTI.  Patient in agreement with treatment plan.  Patient admitted under observation status.    PCP: Blayne Santos      Overview/Hospital Course:  Admitted under Hospital Medicine for NSTEMI, has been on heparin drip initially, scheduled for cardiac catheterization on 04/03/2025    Interval History:     Alert and oriented x3 during examination   Complaining of intermittent chest pain   Scheduled for cardiac catheterization today   Cardiology on board       Review of Systems      Constitutional:  Positive for fatigue. Negative for chills, diaphoresis and fever.   Respiratory:  Negative for cough and shortness of breath.    Cardiovascular:  Positive for chest pain. Negative for palpitations and leg swelling.   Gastrointestinal:  Negative for abdominal pain, diarrhea, nausea and vomiting.   Genitourinary:  Negative for dysuria, flank pain, frequency and hematuria.   Neurological:  Positive for dizziness and light-headedness.       Objective:     Vital Signs (Most Recent):  Temp: 97.6 °F (36.4 °C) (04/03/25 1558)  Pulse: (!) 48 (04/03/25 1601)  Resp: 18 (04/03/25 1558)  BP: (!) 150/65 (04/03/25 1558)  SpO2: 97 % (04/03/25 1558) Vital Signs (24h Range):  Temp:  [97.4 °F (36.3 °C)-98.6 °F (37 °C)] 97.6 °F (36.4 °C)  Pulse:  [43-59] 48  Resp:  [16-22] 18  SpO2:  [95 %-100 %] 97 %  BP: (124-185)/() 150/65     Weight: 69 kg (152 lb 1.9 oz)  Body mass index is 29.71 kg/m².  No intake or output data in the 24 hours ending 04/03/25 1702      Physical Exam      Constitutional:       General: She is awake. She is not in acute  distress.     Appearance: Normal appearance. She is well-developed and well-groomed. She is not ill-appearing, toxic-appearing or diaphoretic.   HENT:      Head: Normocephalic and atraumatic.      Mouth/Throat:      Lips: Pink.      Mouth: Mucous membranes are moist.      Pharynx: Oropharynx is clear. Uvula midline.   Eyes:      Extraocular Movements: Extraocular movements intact.      Conjunctiva/sclera: Conjunctivae normal.      Pupils: Pupils are equal, round, and reactive to light.   Cardiovascular:      Rate and Rhythm: Normal rate and regular rhythm.      Heart sounds: Normal heart sounds. No murmur heard.  Pulmonary:      Effort: Pulmonary effort is normal.      Breath sounds: Decreased breath sounds present. No wheezing, rhonchi or rales.   Abdominal:      General: Bowel sounds are normal.      Palpations: Abdomen is soft.      Tenderness: There is no abdominal tenderness. There is no right CVA tenderness, left CVA tenderness, guarding or rebound.   Musculoskeletal:      Cervical back: Normal range of motion and neck supple.      Right lower leg: No edema.      Left lower leg: No edema.      Comments: 5/5 strength throughout   Skin:     General: Skin is warm and dry.      Capillary Refill: Capillary refill takes less than 2 seconds.   Neurological:      General: No focal deficit present.      Mental Status: She is alert and oriented to person, place, and time. Mental status is at baseline.      GCS: GCS eye subscore is 4. GCS verbal subscore is 5. GCS motor subscore is 6.      Cranial Nerves: Cranial nerves 2-12 are intact.      Sensory: Sensation is intact.      Motor: Motor function is intact.   Psychiatric:         Mood and Affect: Mood normal.         Speech: Speech normal.         Behavior: Behavior normal. Behavior is cooperative.        Significant Labs: All pertinent labs within the past 24 hours have been reviewed.  CBC:   Recent Labs   Lab 04/02/25  1516 04/03/25  0442   WBC 8.83 5.35   HGB 11.1*  10.3*   HCT 34.3* 31.5*    246     CMP:   Recent Labs   Lab 04/02/25  1516 04/03/25  0442    139   K 4.5 4.0    111*   CO2 21* 21*   BUN 46* 48*   CREATININE 1.6* 1.5*   CALCIUM 10.7* 9.6   ALBUMIN 3.9  --    BILITOT 0.4  --    ALKPHOS 143  --    AST 20  --    ALT 20  --    ANIONGAP 10 7*       Significant Imaging:     Imaging Results              X-Ray Chest 1 View (Final result)  Result time 04/02/25 14:54:15      Final result by Cherry Harp MD (Timothy) (04/02/25 14:54:15)                   Impression:      No infiltrates      Electronically signed by: Cherry Harp MD  Date:    04/02/2025  Time:    14:54               Narrative:    EXAMINATION:  XR CHEST 1 VIEW    CLINICAL HISTORY:  Shortness of breath,    COMPARISON:  Chest, 03/29/2025    FINDINGS:  One view.  Stable heart size.  The lung fields are clear. No acute cardiopulmonary infiltrate.                                         Assessment & Plan  NSTEMI (non-ST elevated myocardial infarction)  Patient presents with NSTEMI. Chest pain is currently controlled. HODAN score is 6. Patient is currently on NSTEMI Pathway.    EKG reviewed. Troponins reviewed and results noted-   Recent Labs   Lab 04/03/25 0442   TROPONINI 0.260*   .     Lipid panel reviewed and shows-     Lab Results   Component Value Date    LDLCALC 58 08/20/2024     Lab Results   Component Value Date    TRIG 61 04/02/2025         Medical management includes; Dual Anti-Platelet therapy, High Intensity Statin, and ACE/ARB Echo has not been performed. Latest ECHO results are as follows- No results found for this or any previous visit.  .   Consult for cardiac rehab is ordered. Patient counseled on lifestyle modifications- follow a low fat, low cholesterol diet, attempt to lose weight, decrease or avoid alcohol intake, reduce salt in diet and cooking, reduce exposure to stress, and continue current medications. Cardiology is consulted. Plan of care reviewed with cardiology  team. Continue to monitor patient closely and adjust therapy as needed.      Acute cystitis  Urinalysis revealed:   Lab Results   Component Value Date    COLORU Colorless (A) 04/02/2025    APPEARANCEUA Clear 04/02/2025    SPECGRAV 1.010 04/02/2025    PHUR 6.0 04/02/2025    PROTEINUA Negative 04/02/2025    GLUCUA Negative 04/02/2025    KETONESU Negative 05/07/2017    NITRITE Negative 04/02/2025    UROBILINOGEN Negative 04/02/2025    BILIRUBINUA Negative 04/02/2025    LEUKOCYTESUR 1+ (A) 04/02/2025    RBCUA 5 (H) 03/29/2025    WBCUA 3 04/02/2025    BACTERIA Rare 04/02/2025    HYALINECASTS 2 (H) 04/02/2025   Diagnosed with UTI on 03/30/2025.  Compliant with levofloxacin 250 mg p.o. daily.  Plan:  -UA culture pending  -Continue Abx              CARLOS (acute kidney injury)  CARLOS is likely due to pre-renal azotemia due to dehydration. Baseline creatinine is  1.6 . Most recent creatinine and eGFR are listed below.  Recent Labs     04/02/25  1516 04/03/25  0442   CREATININE 1.6* 1.5*   EGFRNORACEVR 31* 34*      Plan  - CARLOS is  being treated  - Avoid nephrotoxins and renally dose meds for GFR listed above  - Monitor urine output, serial BMP, and adjust therapy as needed    Essential hypertension  Chronic, controlled.  Latest blood pressure and vitals reviewed-   Temp:  [97.4 °F (36.3 °C)-98.6 °F (37 °C)]   Pulse:  [43-59]   Resp:  [16-22]   BP: (124-185)/()   SpO2:  [95 %-100 %] .   Home meds for hypertension were reviewed and noted below.   Hypertension Medications              nitroGLYCERIN (NITROSTAT) 0.4 MG SL tablet Place 0.4 mg under the tongue.    valsartan (DIOVAN) 80 MG tablet Take 80 mg by mouth.            While in the hospital, will manage blood pressure as follows; Continue home antihypertensive regimen    Will utilize p.r.n. blood pressure medication only if patient's blood pressure greater than  160/90 and she develops symptoms such as worsening chest pain or shortness of breath.    Hypothyroidism  Patient  has chronic hypothyroidism. TFTs reviewed-   Lab Results   Component Value Date    TSH 1.05 09/05/2024   . Will continue chronic levothyroxine and adjust for and clinical changes.      Dyslipidemia due to type 2 diabetes mellitus  Patient is chronically on statin.will continue for now. Last Lipid Panel:   Lab Results   Component Value Date    CHOL 150 04/02/2025    HDL 51 04/02/2025    LDLCALC 58 08/20/2024    TRIG 61 04/02/2025    CHOLHDL 34.0 04/02/2025     Plan:  -Continue home medication  -low fat/low calorie diet      VTE Risk Mitigation (From admission, onward)           Ordered     IP VTE HIGH RISK PATIENT  Once         04/02/25 2031     Place sequential compression device  Until discontinued         04/02/25 2031                    Discharge Planning   JUANA: 4/6/2025     Code Status: Full Code   Medical Readiness for Discharge Date:   Discharge Plan A: Home with family                        Jarethpatito Iron Enrique MD  Department of Hospital Medicine   [facility] - Telemetry ([facility])

## 2025-04-03 NOTE — PLAN OF CARE
[facility] - Cath Lab (Hospital)  Initial Discharge Assessment       Primary Care Provider: Blayne Santos MD    Admission Diagnosis: Weakness [R53.1]  SOB (shortness of breath) [R06.02]  Chest heaviness [R07.89]  Acute non Q wave MI (myocardial infarction), initial episode of care [I21.4]  Chest pain, unspecified type [R07.9]    Admission Date: 4/2/2025  Expected Discharge Date: Per Attending     Transition of Care Barriers: None    Payor: [employer] MANAGED MEDICARE / Plan: [employer] MEDICARE HMO / Product Type: Capitation /     Extended Emergency Contact Information  Primary Emergency Contact: Joana Bourgeois   [facility]  Home Phone: [phone]  Mobile Phone: [phone]  Relation: Daughter  Secondary Emergency Contact: Kaylah Donovan  Mobile Phone: [phone]  Relation: Daughter  Preferred language: English   needed? No    Discharge Plan A: Home with family         [id number] [place], [place] - [address] [place] [address] [place] [address] [address] [place] [address] [address] [address] [address]  Phone: [phone] Fax: [phone]      Initial Assessment (most recent)       Adult Discharge Assessment - 04/03/25 1141          Discharge Assessment    Assessment Type Discharge Planning Assessment     Confirmed/corrected address, phone number and insurance Yes     Confirmed Demographics Correct on Facesheet     Source of Information family     Communicated JUANA with patient/caregiver Date not available/Unable to determine     Reason For Admission NSTEMI     People in Home alone   in mother-in-law suite in the backyard of granddaughter's home    Do you expect to return to your current living situation? Yes     Do you have help at home or someone to help you manage your care at home? Yes     Who are your caregiver(s) and their phone number(s)? family     Prior to hospitilization cognitive status: Alert/Oriented     Current cognitive status: Alert/Oriented     Walking or Climbing  Stairs Difficulty no     Dressing/Bathing Difficulty no     Home Layout Able to live on 1st floor     Equipment Currently Used at Home bath bench     Readmission within 30 days? No     Patient currently being followed by outpatient case management? No     Do you currently have service(s) that help you manage your care at home? No     Do you take prescription medications? Yes     Do you have prescription coverage? Yes     Coverage [employer]     Do you have any problems affording any of your prescribed medications? No     Is the patient taking medications as prescribed? yes     Who is going to help you get home at discharge? family     How do you get to doctors appointments? car, drives self;family or friend will provide     Are you on dialysis? No     Do you take coumadin? No     Discharge Plan A Home with family     DME Needed Upon Discharge  none     Discharge Plan discussed with: Adult children     Transition of Care Barriers None                   Sw spoke with pt's dtr to confirm information and to discuss d/c plans. Pt lives in a mother-in-law suite at her granddaughter's home. Pt is independent at baseline.     Patient has no d/c needs at this time. Sw to follow up, as needed, for d/c planning purposes.

## 2025-04-03 NOTE — ASSESSMENT & PLAN NOTE
Chronic, controlled.  Latest blood pressure and vitals reviewed-   Temp:  [97.4 °F (36.3 °C)-98.2 °F (36.8 °C)]   Pulse:  [43-52]   Resp:  [17-22]   BP: (141-185)/()   SpO2:  [95 %-100 %] .   Home meds for hypertension were reviewed and noted below.   Hypertension Medications              nitroGLYCERIN (NITROSTAT) 0.4 MG SL tablet Place 0.4 mg under the tongue.    valsartan (DIOVAN) 80 MG tablet Take 80 mg by mouth.     While in the hospital, will manage blood pressure as follows; Continue home antihypertensive regimen    Will utilize p.r.n. blood pressure medication only if patient's blood pressure greater than  160/90 and she develops symptoms such as worsening chest pain or shortness of breath.

## 2025-04-03 NOTE — HPI
Ms. Floyd is an 89 year old female patient whose current medical conditions include CAD s/p prior LAD stent in 2007, NIDDM, CKD, HTN, and dyslipidemia who presented to [facility] ED yesterday evening due to substernal chest tightness/pressure that onset around 8 AM. Associated symptoms included fatigue/weakness. Patient denied any associated SOB, fever, chills, palpitations, near syncope, or syncope. Reported she had been diagnosed with a URI a week ago but seemed to have mostly recovered. Initial workup in ED revealed creatinine of 1.6, troponin of 0.244>0.271, +UTI. Patient subsequently admitted for further evaluation and treatment. Cardiology consulted to assist with management. Patient seen and examined today, resting in bed. Feels ok. States chest pain has resolved. Still feels fatigued. Typically does not have CP symptoms. Very active for her age, lives at home alone and still drives. She reports compliance with her medications. Followed on OP basis by outside cardiologist, Dr. Dempsey. Prior treadmill stress test in [facility] last year negative however exercise capacity was reduced. TTE pending.  Troponin 0.244>0.271>0.302>0.298>0.260. EKG reviewed, sinus bradycardia. [facility] planned today by Dr. Quiroz.

## 2025-04-03 NOTE — DISCHARGE INSTRUCTIONS
"                                      Post-op Heart Catheterization    1. DIET: It is advisable for you to follow a diet that limits the intake of salt, sugar, saturated fats and cholesterol.     2. FOR THE NEXT 24 HOURS:   For the next 8 hours, you should be watched by a responsible adult. This person should make sure your condition is not getting worse.   Don't drink any alcohol for the next 24 hours.  Don't drive, operate dangerous machinery, or make important business or personal decisions during the next 24 hours.  Your healthcare provider may tell you not to take any medicine by mouth for pain or sleep in the next 4 hours. These medicines may react with the medicines you were given in the hospital. This could cause a much stronger response than usual.       3. ACTIVITY:                                Day of discharge:             NO vigorous activity, lifting or straining                                                   Day after discharge:         Avoid heavy lifting (up to 10 lbs)                                                                        The day after discharge you may shower, but avoid tub baths for 5 days                                                                         Wash site gently with soap and water        NO powder or lotion to your procedure site.                 Before you shower, remove dressing, apply bandaid if desired                                                                                                                                                                            2nd day after discharge:  Resume normal activities                                                                         Exercise program as instructed      4. WOUND CARE: It is not unusual to have a small amount of bruising appear in the groin area. It is also common to have a tender "knot" develop beneath the skin at the puncture site in the groin. This is scar tissue only and is not a " "cause for concern or alarm. This tender knot may take several weeks to fully resolve. The bruise will usually spread over several days. If the lump gets bigger, call you doctor immediately.    5. DISCOMFORT: For general discomfort at the puncture site, you may take 1 or 2 Acetaminophen (Tylenol) tablets every 4 hours as needed. (Do not take more than 4000 mg a day)                 6. CALL YOUR HEALTHCARE PROVIDER IF YOU START TO HAVE THE FOLLOWING SYMPTOMS:        1. Problems with the affected leg: Pain, discomfort, loss of warmth, numbness or tingling                                                                                                                            2. Problems at the groin site: Bleeding, pain that is sudden/sharp/persistent,                   swelling at site or a change in "lump" size, increased redness or drainage at                     puncture site                                                               3. High fever (101 degrees or higher)       4.  Drowsiness that doesn't get better       5. Weakness or dizziness that doesn't get better            6. Repeated vomiting        7. GO TO  THE EMERGENCY ROOM OR CALL 911 IF YOU HAVE: Chest pains or discomforts not relieved with 3 nitroglycerin doses (sublingual tablets or spray), numbness or severe pain or if your foot or leg becomes cold or discolored or uncontrolled bleeding from site (apply direct pressure above site).        .Our goal at [facility] is to always give you outstanding care and exceptional service. You may receive a survey from [employer] by mail, text or e-mail in the next 24-48 hours asking about the care you received with us. The survey should only take 5-10 minutes to complete and is very important to us.     Your feedback provides us with a way to recognize our staff who work tirelessly to provide the best care! Also, your responses help us learn how to improve when your experience was below our aspiration of " excellence. We are always looking for ways to improve your stay. We WILL use your feedback to continue making improvements to help us provide the highest quality care. We keep your personal information and feedback confidential. We appreciate your time completing this survey and can't wait to hear from you!!!    We look forward to your continued care with us! Thanks so much for choosing [facility] for your healthcare needs!    Recommend compliance with medications, outpatient follow up with PCP, Cardiology upon discharge

## 2025-04-03 NOTE — SUBJECTIVE & OBJECTIVE
Interval History:     Alert and oriented x3 during examination   Complaining of intermittent chest pain   Scheduled for cardiac catheterization today   Cardiology on board       Review of Systems      Constitutional:  Positive for fatigue. Negative for chills, diaphoresis and fever.   Respiratory:  Negative for cough and shortness of breath.    Cardiovascular:  Positive for chest pain. Negative for palpitations and leg swelling.   Gastrointestinal:  Negative for abdominal pain, diarrhea, nausea and vomiting.   Genitourinary:  Negative for dysuria, flank pain, frequency and hematuria.   Neurological:  Positive for dizziness and light-headedness.       Objective:     Vital Signs (Most Recent):  Temp: 97.6 °F (36.4 °C) (04/03/25 1558)  Pulse: (!) 48 (04/03/25 1601)  Resp: 18 (04/03/25 1558)  BP: (!) 150/65 (04/03/25 1558)  SpO2: 97 % (04/03/25 1558) Vital Signs (24h Range):  Temp:  [97.4 °F (36.3 °C)-98.6 °F (37 °C)] 97.6 °F (36.4 °C)  Pulse:  [43-59] 48  Resp:  [16-22] 18  SpO2:  [95 %-100 %] 97 %  BP: (124-185)/() 150/65     Weight: 69 kg (152 lb 1.9 oz)  Body mass index is 29.71 kg/m².  No intake or output data in the 24 hours ending 04/03/25 1702      Physical Exam      Constitutional:       General: She is awake. She is not in acute distress.     Appearance: Normal appearance. She is well-developed and well-groomed. She is not ill-appearing, toxic-appearing or diaphoretic.   HENT:      Head: Normocephalic and atraumatic.      Mouth/Throat:      Lips: Pink.      Mouth: Mucous membranes are moist.      Pharynx: Oropharynx is clear. Uvula midline.   Eyes:      Extraocular Movements: Extraocular movements intact.      Conjunctiva/sclera: Conjunctivae normal.      Pupils: Pupils are equal, round, and reactive to light.   Cardiovascular:      Rate and Rhythm: Normal rate and regular rhythm.      Heart sounds: Normal heart sounds. No murmur heard.  Pulmonary:      Effort: Pulmonary effort is normal.      Breath  sounds: Decreased breath sounds present. No wheezing, rhonchi or rales.   Abdominal:      General: Bowel sounds are normal.      Palpations: Abdomen is soft.      Tenderness: There is no abdominal tenderness. There is no right CVA tenderness, left CVA tenderness, guarding or rebound.   Musculoskeletal:      Cervical back: Normal range of motion and neck supple.      Right lower leg: No edema.      Left lower leg: No edema.      Comments: 5/5 strength throughout   Skin:     General: Skin is warm and dry.      Capillary Refill: Capillary refill takes less than 2 seconds.   Neurological:      General: No focal deficit present.      Mental Status: She is alert and oriented to person, place, and time. Mental status is at baseline.      GCS: GCS eye subscore is 4. GCS verbal subscore is 5. GCS motor subscore is 6.      Cranial Nerves: Cranial nerves 2-12 are intact.      Sensory: Sensation is intact.      Motor: Motor function is intact.   Psychiatric:         Mood and Affect: Mood normal.         Speech: Speech normal.         Behavior: Behavior normal. Behavior is cooperative.        Significant Labs: All pertinent labs within the past 24 hours have been reviewed.  CBC:   Recent Labs   Lab 04/02/25  1516 04/03/25  0442   WBC 8.83 5.35   HGB 11.1* 10.3*   HCT 34.3* 31.5*    246     CMP:   Recent Labs   Lab 04/02/25  1516 04/03/25  0442    139   K 4.5 4.0    111*   CO2 21* 21*   BUN 46* 48*   CREATININE 1.6* 1.5*   CALCIUM 10.7* 9.6   ALBUMIN 3.9  --    BILITOT 0.4  --    ALKPHOS 143  --    AST 20  --    ALT 20  --    ANIONGAP 10 7*       Significant Imaging:     Imaging Results              X-Ray Chest 1 View (Final result)  Result time 04/02/25 14:54:15      Final result by Cherry Harp MD (Timothy) (04/02/25 14:54:15)                   Impression:      No infiltrates      Electronically signed by: Cherry Harp MD  Date:    04/02/2025  Time:    14:54               Narrative:    EXAMINATION:  XR  CHEST 1 VIEW    CLINICAL HISTORY:  Shortness of breath,    COMPARISON:  Chest, 03/29/2025    FINDINGS:  One view.  Stable heart size.  The lung fields are clear. No acute cardiopulmonary infiltrate.

## 2025-04-03 NOTE — INTERVAL H&P NOTE
The patient has been examined and the H&P has been reviewed:    I concur with the findings and no changes have occurred since H&P was written.    Procedure risks, benefits and alternative options discussed and understood by patient/family.          Active Hospital Problems    Diagnosis  POA    *NSTEMI (non-ST elevated myocardial infarction) [I21.4]  Unknown    Acute cystitis [N30.00]  Unknown    Dyslipidemia due to type 2 diabetes mellitus [E11.69, E78.5]  Yes     Unable to tolerate potent statins  6/17/2015 , , HDL 50, LDL 98 on prava  7/22/16:   HDL 60 LDL 96 on prava  6/7/17:   HDL 55    12/13/17:   HDL 45 LDL 90  5/8/18:   HDL 45  ALT 13 on prava  7/10/19:   HDL 52 LDL 95 on atorva 40  Component      Latest Ref Rng & Units 6/19/2020   Cholesterol      0 - 199 mg/dL 151   Triglycerides      0 - 149 mg/dL 93   HDL Cholesterol      >=50 mg/dL 65   LDL Calculated      0 - 129 mg/dL 67     Component      Latest Ref Rng & Units 6/7/2021   Cholesterol      100 - 199 mg/dL 135   Triglycerides      0 - 149 mg/dL 142   HDL Cholesterol      >39 mg/dL 54   VLDL Cholesterol Lyle      5 - 40 mg/dL 24   LDL Calculated      0 - 99 mg/dL 57   ALT      0 - 32 IU/L 18     Component      Latest Ref Rng & Units 6/21/2022   Cholesterol      100 - 199 mg/dL 149   Triglycerides      0 - 149 mg/dL 130   HDL Cholesterol      >39 mg/dL 55   VLDL Cholesterol Lyle      5 - 40 mg/dL 23   LDL Calculated      0 - 99 mg/dL 71     Component      Latest Ref Rng 7/7/2023   Cholesterol      100 - 199 mg/dL 138    Triglycerides      0 - 149 mg/dL 153 (H)    HDL Cholesterol      >39 mg/dL 69    VLDL Cholesterol Lyle      5 - 40 mg/dL 25    LDL Calculated      0 - 99 mg/dL 44    ALT      0 - 32 IU/L 32      Hypothyroidism [E03.9]  Yes     3/22/16  0.63  12/1/16: TSH 0.76  12/13/17: TSH 1.848  11/15/18: TSH 2.040  7/10/19: TSH 1.21  Component      Latest Ref Rng & Units  9/30/2020   TSH Ultrasensitive      0.450 - 4.500 uIU/mL 1.520     Component      Latest Ref Rng & Units 6/21/2022   TSH Ultrasensitive      0.450 - 4.500 uIU/mL 1.110     Component      Latest Ref Rng & Units 10/24/2022   TSH      0.27 - 4.20 uIU/mL 0.78   Free T4      0.93 - 1.70 ng/dL 1.61     Component      Latest Ref Rng 7/7/2023   TSH Ultrasensitive      0.450 - 4.500 uIU/mL 1.630      CARLOS (acute kidney injury) [N17.9]  Unknown    Essential hypertension [I10]  Yes     12/2015 Neg ZENAIDA.  On CCB/ARB/HCTZ.  12/1/16: ZENAIDA neg  3/14/18: ZENAIDA neg  7/10/19: ZENAIDA neg  Component      Latest Ref Rng & Units 6/21/2022   Microalbumin : Creatinine Ratio      0 - 29 mg/g creat 9     Component      Latest Ref Rng 7/7/2023   Microalbumin : Creatinine Ratio      0 - 29 mg/g creat 13        Resolved Hospital Problems   No resolved problems to display.

## 2025-04-03 NOTE — BRIEF OP NOTE
INPATIENT Operative Note         SUMMARY     Surgery Date: 4/3/2025     Surgeons and Role:     * Eitan Quiroz MD - Primary    ASSISTANT:none    Pre-op Diagnosis:  NSTEMI (non-ST elevated myocardial infarction) [I21.4]  Primary hypertension [I10]      Post-op Diagnosis:  NSTEMI (non-ST elevated myocardial infarction) [I21.4]  Primary hypertension [I10]    Procedure(s) (LRB):  Left heart cath (Left)    COMPLICATION:none    Anesthesia: RN IV Sedation    Findings/Key Components:  Non obs lad disease. Otherwise normal.  Lvf normal   Low filling pressures    Estimated Blood Loss: < 50 ML.         SPECIMEN: NONE    Devices/Prostetics: None    PLAN: reassure

## 2025-04-03 NOTE — ASSESSMENT & PLAN NOTE
Patient presents with NSTEMI. Chest pain is currently controlled. HODAN score is 6. Patient is currently on NSTEMI Pathway.    EKG reviewed. Troponins reviewed and results noted-   Recent Labs   Lab 04/03/25  0121   TROPONINI 0.298*     Lipid panel reviewed and shows-     Lab Results   Component Value Date    LDLCALC 58 08/20/2024     Lab Results   Component Value Date    TRIG 61 04/02/2025     Medical management includes; Dual Anti-Platelet therapy, High Intensity Statin, and ACE/ARB Echo has not been performed. Latest ECHO results are as follows- No results found for this or any previous visit.    Consult for cardiac rehab is ordered. Patient counseled on lifestyle modifications- follow a low fat, low cholesterol diet, attempt to lose weight, decrease or avoid alcohol intake, reduce salt in diet and cooking, reduce exposure to stress, and continue current medications. Cardiology is consulted. Plan of care reviewed with cardiology team. Continue to monitor patient closely and adjust therapy as needed.

## 2025-04-03 NOTE — PLAN OF CARE
Problem: Adult Inpatient Plan of Care  Goal: Plan of Care Review  Outcome: Progressing  Goal: Patient-Specific Goal (Individualized)  Outcome: Progressing  Goal: Absence of Hospital-Acquired Illness or Injury  Outcome: Progressing  Goal: Optimal Comfort and Wellbeing  Outcome: Progressing  Goal: Readiness for Transition of Care  Outcome: Progressing     Problem: Acute Coronary Syndrome  Goal: Optimal Adaptation to Illness  Outcome: Progressing  Goal: Absence of Cardiac-Related Pain  Outcome: Progressing  Goal: Normalized Cardiac Rhythm  Outcome: Progressing  Goal: Effective Cardiac Pump Function  Outcome: Progressing  Goal: Adequate Tissue Perfusion  Outcome: Progressing     Problem: Cardiac Catheterization (Diagnostic/Interventional)  Goal: Absence of Bleeding  Outcome: Progressing  Goal: Absence of Contrast-Induced Injury  Outcome: Progressing  Goal: Stable Heart Rate and Rhythm  Outcome: Progressing  Goal: Absence of Embolism Signs and Symptoms  Outcome: Progressing  Goal: Anesthesia/Sedation Recovery  Outcome: Progressing  Goal: Optimal Pain Control and Function  Outcome: Progressing  Goal: Absence of Vascular Access Complication  Outcome: Progressing     Problem: Fall Injury Risk  Goal: Absence of Fall and Fall-Related Injury  Outcome: Progressing     Problem: Skin Injury Risk Increased  Goal: Skin Health and Integrity  Outcome: Progressing

## 2025-04-03 NOTE — SUBJECTIVE & OBJECTIVE
"Past Medical History:   Diagnosis Date    Arthritis     Cancer     Coronary artery disease     Depression     Diabetes mellitus     History of heart artery stent     Hypertension     MI (myocardial infarction)     Renal disorder     Thyroid disease        Past Surgical History:   Procedure Laterality Date    APPENDECTOMY       SECTION      CHOLECYSTECTOMY      HYSTERECTOMY         Review of patient's allergies indicates:   Allergen Reactions    Codeine      "stomach cramps"    Morphine        No current facility-administered medications on file prior to encounter.     Current Outpatient Medications on File Prior to Encounter   Medication Sig    albuterol (PROVENTIL/VENTOLIN HFA) 90 mcg/actuation inhaler Inhale 2 puffs into the lungs every 6 (six) hours as needed for Wheezing or Shortness of Breath. Rescue    atorvastatin (LIPITOR) 40 MG tablet Take 40 mg by mouth once daily.    clopidogrel (PLAVIX) 75 mg tablet Take 75 mg by mouth.    diazePAM (VALIUM) 2 MG tablet Take 1 tablet (2 mg total) by mouth every 6 (six) hours as needed (muscle spasm).    levoFLOXacin (LEVAQUIN) 250 MG tablet Take 1 tablet (250 mg total) by mouth once daily. for 7 days    levothyroxine (SYNTHROID) 50 MCG tablet TAKE 1 TABLET EVERY DAY    methylPREDNISolone (MEDROL DOSEPACK) 4 mg tablet use as directed    valsartan (DIOVAN) 80 MG tablet Take 80 mg by mouth.    vitamin D (VITAMIN D3) 1000 units Tab Take 5,000 Units by mouth.    baclofen (LIORESAL) 10 MG tablet Take 1 tablet (10 mg total) by mouth 4 (four) times daily. for 5 days    estradioL (ESTRACE) 0.01 % (0.1 mg/gram) vaginal cream Place 1 g vaginally 3 (three) times a week.    nitroGLYCERIN (NITROSTAT) 0.4 MG SL tablet Place 0.4 mg under the tongue.    tiZANidine (ZANAFLEX) 4 MG tablet Take 4 mg by mouth.     Family History    None       Tobacco Use    Smoking status: Never    Smokeless tobacco: Not on file   Substance and Sexual Activity "    Alcohol use: Not Currently    Drug use: Never    Sexual activity: Yes     Review of Systems   Constitutional: Positive for malaise/fatigue.   HENT: Negative.     Eyes: Negative.    Cardiovascular:  Positive for chest pain.   Respiratory: Negative.     Endocrine: Negative.    Hematologic/Lymphatic: Negative.    Skin: Negative.    Musculoskeletal:  Positive for arthritis and joint pain.   Gastrointestinal: Negative.    Genitourinary: Negative.    Neurological:  Positive for weakness.   Psychiatric/Behavioral: Negative.     Allergic/Immunologic: Negative.      Objective:     Vital Signs (Most Recent):  Temp: 97.6 °F (36.4 °C) (04/03/25 0823)  Pulse: (!) 54 (04/03/25 0823)  Resp: 16 (04/03/25 0823)  BP: (!) 142/65 (04/03/25 0823)  SpO2: 98 % (04/03/25 0823) Vital Signs (24h Range):  Temp:  [97.4 °F (36.3 °C)-98.2 °F (36.8 °C)] 97.6 °F (36.4 °C)  Pulse:  [43-54] 54  Resp:  [16-22] 16  SpO2:  [95 %-100 %] 98 %  BP: (135-185)/() 142/65     Weight: 69 kg (152 lb 1.9 oz)  Body mass index is 29.71 kg/m².    SpO2: 98 %       No intake or output data in the 24 hours ending 04/03/25 0910    Lines/Drains/Airways       Peripheral Intravenous Line  Duration                  Peripheral IV - Single Lumen 04/02/25 1810 20 G Anterior;Left;Proximal Forearm <1 day                     Physical Exam  Vitals and nursing note reviewed.   Constitutional:       General: She is not in acute distress.     Appearance: Normal appearance. She is well-developed. She is not diaphoretic.   HENT:      Head: Normocephalic and atraumatic.   Eyes:      General:         Right eye: No discharge.         Left eye: No discharge.      Pupils: Pupils are equal, round, and reactive to light.   Cardiovascular:      Rate and Rhythm: Regular rhythm. Bradycardia present.      Heart sounds: Normal heart sounds, S1 normal and S2 normal. No murmur heard.  Pulmonary:      Effort: Pulmonary effort is normal. No respiratory distress.      Breath sounds: Normal  "breath sounds.   Abdominal:      General: There is no distension.   Musculoskeletal:      Right lower leg: No edema.      Left lower leg: No edema.   Skin:     General: Skin is warm and dry.      Findings: No erythema.   Neurological:      Mental Status: She is alert and oriented to person, place, and time.   Psychiatric:         Mood and Affect: Mood normal.         Behavior: Behavior normal.         Thought Content: Thought content normal.        Significant Labs: CMP   Recent Labs   Lab 04/02/25  1516      K 4.5      CO2 21*   BUN 46*   CREATININE 1.6*   CALCIUM 10.7*   ALBUMIN 3.9   BILITOT 0.4   ALKPHOS 143   AST 20   ALT 20   ANIONGAP 10   , CBC   Recent Labs   Lab 04/02/25  1516 04/03/25  0442   WBC 8.83 5.35   HGB 11.1* 10.3*   HCT 34.3* 31.5*    246   , Troponin No results for input(s): "TROPONINIHS" in the last 48 hours., and All pertinent lab results from the last 24 hours have been reviewed.    Significant Imaging: Echocardiogram: Transthoracic echo (TTE) complete (Cupid Only): No results found for this or any previous visit. and EKG: Reviewed  "

## 2025-04-03 NOTE — ASSESSMENT & PLAN NOTE
Urinalysis revealed:   Lab Results   Component Value Date    COLORU Colorless (A) 04/02/2025    APPEARANCEUA Clear 04/02/2025    SPECGRAV 1.010 04/02/2025    PHUR 6.0 04/02/2025    PROTEINUA Negative 04/02/2025    GLUCUA Negative 04/02/2025    KETONESU Negative 05/07/2017    NITRITE Negative 04/02/2025    UROBILINOGEN Negative 04/02/2025    BILIRUBINUA Negative 04/02/2025    LEUKOCYTESUR 1+ (A) 04/02/2025    RBCUA 5 (H) 03/29/2025    WBCUA 3 04/02/2025    BACTERIA Rare 04/02/2025    HYALINECASTS 2 (H) 04/02/2025   Diagnosed with UTI on 03/30/2025.  Compliant with levofloxacin 250 mg p.o. daily.  Plan:  -UA culture pending  -Continue Abx

## 2025-04-03 NOTE — SUBJECTIVE & OBJECTIVE
"Past Medical History:   Diagnosis Date    Arthritis     Cancer     Coronary artery disease     Depression     Diabetes mellitus     History of heart artery stent     Hypertension     MI (myocardial infarction)     Renal disorder     Thyroid disease        Past Surgical History:   Procedure Laterality Date    APPENDECTOMY       SECTION      CHOLECYSTECTOMY      HYSTERECTOMY         Review of patient's allergies indicates:   Allergen Reactions    Codeine      "stomach cramps"    Morphine        No current facility-administered medications on file prior to encounter.     Current Outpatient Medications on File Prior to Encounter   Medication Sig    albuterol (PROVENTIL/VENTOLIN HFA) 90 mcg/actuation inhaler Inhale 2 puffs into the lungs every 6 (six) hours as needed for Wheezing or Shortness of Breath. Rescue    atorvastatin (LIPITOR) 40 MG tablet Take 40 mg by mouth once daily.    clopidogrel (PLAVIX) 75 mg tablet Take 75 mg by mouth.    diazePAM (VALIUM) 2 MG tablet Take 1 tablet (2 mg total) by mouth every 6 (six) hours as needed (muscle spasm).    levoFLOXacin (LEVAQUIN) 250 MG tablet Take 1 tablet (250 mg total) by mouth once daily. for 7 days    levothyroxine (SYNTHROID) 50 MCG tablet TAKE 1 TABLET EVERY DAY    methylPREDNISolone (MEDROL DOSEPACK) 4 mg tablet use as directed    valsartan (DIOVAN) 80 MG tablet Take 80 mg by mouth.    vitamin D (VITAMIN D3) 1000 units Tab Take 5,000 Units by mouth.    baclofen (LIORESAL) 10 MG tablet Take 1 tablet (10 mg total) by mouth 4 (four) times daily. for 5 days    estradioL (ESTRACE) 0.01 % (0.1 mg/gram) vaginal cream Place 1 g vaginally 3 (three) times a week.    nitroGLYCERIN (NITROSTAT) 0.4 MG SL tablet Place 0.4 mg under the tongue.    tiZANidine (ZANAFLEX) 4 MG tablet Take 4 mg by mouth.     Family History    None       Tobacco Use    Smoking status: Never    Smokeless tobacco: Not on file   Substance and Sexual Activity    Alcohol use: Not Currently    " Drug use: Never    Sexual activity: Yes     Review of Systems   Constitutional:  Positive for fatigue. Negative for chills, diaphoresis and fever.   Respiratory:  Negative for cough and shortness of breath.    Cardiovascular:  Positive for chest pain. Negative for palpitations and leg swelling.   Gastrointestinal:  Negative for abdominal pain, diarrhea, nausea and vomiting.   Genitourinary:  Negative for dysuria, flank pain, frequency and hematuria.   Neurological:  Positive for dizziness and light-headedness.   All other systems reviewed and are negative.    Objective:     Vital Signs (Most Recent):  Temp: 97.4 °F (36.3 °C) (04/02/25 2353)  Pulse: (!) 49 (04/03/25 0413)  Resp: 18 (04/02/25 2353)  BP: (!) 141/65 (04/02/25 2353)  SpO2: 97 % (04/02/25 2353) Vital Signs (24h Range):  Temp:  [97.4 °F (36.3 °C)-98.2 °F (36.8 °C)] 97.4 °F (36.3 °C)  Pulse:  [43-52] 49  Resp:  [17-22] 18  SpO2:  [95 %-100 %] 97 %  BP: (141-185)/() 141/65     Weight: 66.8 kg (147 lb 4.3 oz)  Body mass index is 28.76 kg/m².     Physical Exam  Vitals and nursing note reviewed.   Constitutional:       General: She is awake. She is not in acute distress.     Appearance: Normal appearance. She is well-developed and well-groomed. She is not ill-appearing, toxic-appearing or diaphoretic.   HENT:      Head: Normocephalic and atraumatic.      Mouth/Throat:      Lips: Pink.      Mouth: Mucous membranes are moist.      Pharynx: Oropharynx is clear. Uvula midline.   Eyes:      Extraocular Movements: Extraocular movements intact.      Conjunctiva/sclera: Conjunctivae normal.      Pupils: Pupils are equal, round, and reactive to light.   Cardiovascular:      Rate and Rhythm: Normal rate and regular rhythm.      Heart sounds: Normal heart sounds. No murmur heard.  Pulmonary:      Effort: Pulmonary effort is normal.      Breath sounds: Decreased breath sounds present. No wheezing, rhonchi or rales.   Abdominal:      General: Bowel sounds are normal.       Palpations: Abdomen is soft.      Tenderness: There is no abdominal tenderness. There is no right CVA tenderness, left CVA tenderness, guarding or rebound.   Musculoskeletal:      Cervical back: Normal range of motion and neck supple.      Right lower leg: No edema.      Left lower leg: No edema.      Comments: 5/5 strength throughout   Skin:     General: Skin is warm and dry.      Capillary Refill: Capillary refill takes less than 2 seconds.   Neurological:      General: No focal deficit present.      Mental Status: She is alert and oriented to person, place, and time. Mental status is at baseline.      GCS: GCS eye subscore is 4. GCS verbal subscore is 5. GCS motor subscore is 6.      Cranial Nerves: Cranial nerves 2-12 are intact.      Sensory: Sensation is intact.      Motor: Motor function is intact.   Psychiatric:         Mood and Affect: Mood normal.         Speech: Speech normal.         Behavior: Behavior normal. Behavior is cooperative.              CRANIAL NERVES     CN III, IV, VI   Pupils are equal, round, and reactive to light.     LABS:  Recent Results (from the past 24 hours)   EKG 12-lead    Collection Time: 04/02/25 12:45 PM   Result Value Ref Range    QRS Duration 92 ms    [facility] QTC Calculation 369 ms   ABORH RETYPE    Collection Time: 04/02/25  3:09 PM   Result Value Ref Range    ABORH Retype A POS    Urinalysis, Reflex to Urine Culture Urine, Clean Catch    Collection Time: 04/02/25  3:10 PM    Specimen: Urine   Result Value Ref Range    Color, UA Colorless (A) Straw, Keke, Yellow, Light-Orange    Appearance, UA Clear Clear    pH, UA 6.0 5.0 - 8.0    Spec Grav UA 1.010 1.005 - 1.030    Protein, UA Negative Negative    Glucose, UA Negative Negative    Ketones, UA Negative Negative    Bilirubin, UA Negative Negative    Blood, UA Negative Negative    Nitrites, UA Negative Negative    Urobilinogen, UA Negative <2.0 EU/dL    Leukocyte Esterase, UA 1+ (A) Negative   Urinalysis Microscopic     Collection Time: 04/02/25  3:10 PM   Result Value Ref Range    WBC, UA 3 0 - 5 /HPF    Bacteria, UA Rare None, Rare, Occasional /HPF    Hyaline Casts, UA 2 (H) 0 - 1 /LPF    Microscopic Comment     Comprehensive metabolic panel    Collection Time: 04/02/25  3:16 PM   Result Value Ref Range    Sodium 140 136 - 145 mmol/L    Potassium 4.5 3.5 - 5.1 mmol/L    Chloride 109 95 - 110 mmol/L    CO2 21 (L) 23 - 29 mmol/L    Glucose 75 70 - 110 mg/dL    BUN 46 (H) 8 - 23 mg/dL    Creatinine 1.6 (H) 0.5 - 1.4 mg/dL    Calcium 10.7 (H) 8.7 - 10.5 mg/dL    Protein Total 7.7 6.0 - 8.4 gm/dL    Albumin 3.9 3.5 - 5.2 g/dL    Bilirubin Total 0.4 0.1 - 1.0 mg/dL     40 - 150 unit/L    AST 20 11 - 45 unit/L    ALT 20 10 - 44 unit/L    Anion Gap 10 8 - 16 mmol/L    eGFR 31 (L) >60 mL/min/1.73/m2   CPK    Collection Time: 04/02/25  3:16 PM   Result Value Ref Range     20 - 180 U/L   Troponin I    Collection Time: 04/02/25  3:16 PM   Result Value Ref Range    Troponin-I 0.244 (H) <=0.026 ng/mL   CBC with Differential    Collection Time: 04/02/25  3:16 PM   Result Value Ref Range    WBC 8.83 3.90 - 12.70 K/uL    RBC 3.98 (L) 4.00 - 5.40 M/uL    HGB 11.1 (L) 12.0 - 16.0 gm/dL    HCT 34.3 (L) 37.0 - 48.5 %    MCV 86 82 - 98 fL    MCH 27.9 27.0 - 31.0 pg    MCHC 32.4 32.0 - 36.0 g/dL    RDW 12.6 11.5 - 14.5 %    Platelet Count 289 150 - 450 K/uL    MPV 9.8 9.2 - 12.9 fL    Nucleated RBC 0 <=0 /100 WBC    Neut % 80.5 (H) 38 - 73 %    Lymph % 13.1 (L) 18 - 48 %    Mono % 5.5 4 - 15 %    Eos % 0.1 <=8 %    Basophil % 0.1 <=1.9 %    Imm Grans % 0.7 (H) 0.0 - 0.5 %    Neut # 7.10 1.8 - 7.7 K/uL    Lymph # 1.16 1 - 4.8 K/uL    Mono # 0.49 0.3 - 1 K/uL    Eos # 0.01 <=0.5 K/uL    Baso # 0.01 <=0.2 K/uL    Imm Grans # 0.06 (H) 0.00 - 0.04 K/uL   Troponin I    Collection Time: 04/02/25  6:42 PM   Result Value Ref Range    Troponin-I 0.271 (H) <=0.026 ng/mL   APTT    Collection Time: 04/02/25  6:42 PM   Result Value Ref Range    PTT  21.7 21.0 - 32.0 seconds   Protime-INR    Collection Time: 04/02/25  6:42 PM   Result Value Ref Range    PT 11.5 9.0 - 12.5 seconds    INR 1.0 0.8 - 1.2   Lipid panel    Collection Time: 04/02/25  9:20 PM   Result Value Ref Range    Cholesterol Total 150 120 - 199 mg/dL    Triglyceride 61 30 - 150 mg/dL    HDL Cholesterol 51 40 - 75 mg/dL    LDL Cholesterol 86.8 63.0 - 159.0 mg/dL    HDL/Cholesterol Ratio 34.0 20.0 - 50.0 %    Cholesterol/HDL Ratio 2.9 2.0 - 5.0    Non HDL Cholesterol 99 mg/dL   Hemoglobin A1c    Collection Time: 04/02/25  9:20 PM   Result Value Ref Range    Hemoglobin A1c 6.5 (H) 4.0 - 5.6 %    Estimated Average Glucose 140 (H) 68 - 131 mg/dL   Troponin I    Collection Time: 04/02/25  9:20 PM   Result Value Ref Range    Troponin-I 0.302 (H) <=0.026 ng/mL   Type & Screen    Collection Time: 04/02/25  9:20 PM   Result Value Ref Range    Specimen Outdate 04/05/2025 23:59     Group & Rh A POS     Indirect Vicente NEG    APTT    Collection Time: 04/03/25  1:21 AM   Result Value Ref Range    .9 (H) 21.0 - 32.0 seconds   Troponin I    Collection Time: 04/03/25  1:21 AM   Result Value Ref Range    Troponin-I 0.298 (H) <=0.026 ng/mL       RADIOLOGY  X-Ray Chest 1 View  Result Date: 4/2/2025  EXAMINATION: XR CHEST 1 VIEW CLINICAL HISTORY: Shortness of breath, COMPARISON: Chest, 03/29/2025 FINDINGS: One view.  Stable heart size.  The lung fields are clear. No acute cardiopulmonary infiltrate.     No infiltrates Electronically signed by: Cherry Harp MD Date:    04/02/2025 Time:    14:54    CT Chest Abdomen Pelvis Without Contrast (XPD)  Result Date: 3/29/2025  EXAM: CT CHEST ABDOMEN PELVIS WITHOUT CONTRAST(XPD) CLINICAL HISTORY: Chest and back pain, acute COMPARISON: None TECHNIQUE: Standard CT thin section axial images of the chest abdomen and pelvis with reformatted sagittal and coronal images. FINDINGS:   Right basilar endobronchial filling material.  Lungs otherwise clear.  No pleural effusion,  pleural thickening, or pneumothorax.  No pathologically enlarged mediastinal or hilar lymph nodes are present.  Heart size is within normal limits.  There is no significant pericardial effusion.  No sign of aortic dissection or aortic aneurysm. Moderate LAD coronary artery calcification, calcium, or atherosclerosis is present. The liver, spleen, and adrenal glands are not unusual in contrast-enhanced CT appearance. Pancreatic cystic lesions and calcifications which could relate to cirrhosis and chronic pancreatitis but the cystic lesions are not fully characterized.  Recommend outpatient MRCP for better characterization. Left renal simple cyst and nephrolithiasis.  Otherwise, the kidneys, ureters, and bladder are unremarkable. No evidence of hydronephrosis. Gallbladder surgically absent.  Bile ducts are within normal limits. The stomach, small bowel, and colon are unremarkable.  No evidence of bowel obstruction or acute inflammatory process. No free fluid, free air, or abscess.  The appendix is not identified. Small hiatal hernia. Mild atelectatic atherosclerosis.  No aneurysm.  No pathologically enlarged lymph nodes. Status post hysterectomy. No acute or suspicious osseous lesion is evident.  Osseous degenerative changes.      No definite acute abnormality. Small hiatal hernia. Indeterminate pancreatic cystic lesions for which further characterization with outpatient MRCP is recommended. Right basilar endobronchial filling material of unclear significance. Complete findings as above. All CT scans at [this location] are performed using dose modulation techniques as appropriate to a performed exam including the following: automated exposure control; adjustment of the mA and/or kV according to patient size (this includes techniques or standardized protocols for targeted exams where dose is matched to indication / reason for exam; i.e. extremities or head); use of iterative reconstruction technique. Finalized on:  3/29/2025 11:04 PM By:  Delano Gonzalez MD [facility]# [id number]      2025-03-29 23:06:[id number]     [facility]    X-Ray Chest AP Portable  Result Date: 3/29/2025  EXAM: XR CHEST AP PORTABLE CLINICAL HISTORY: Cough FINDINGS:  Clear lungs.  No pleural effusion or pneumothorax or pulmonary edema.  Normal heart size.  Calcific plaque seen in the aortic arch.      No acute cardiopulmonary process. Finalized on: 3/29/2025 8:59 PM By:  Hakeem Sen MD [facility]# [id number]      2025-03-29 21:01:09.489     [facility]      EKG    MICROBIOLOGY    MDM     Amount and/or Complexity of Data Reviewed  Clinical lab tests: reviewed  Tests in the radiology section of CPT®: reviewed  Tests in the medicine section of CPT®: reviewed  Discussion of test results with the performing providers: yes  Decide to obtain previous medical records or to obtain history from someone other than the patient: yes  Obtain history from someone other than the patient: yes  Review and summarize past medical records: yes  Discuss the patient with other providers: yes  Independent visualization of images, tracings, or specimens: yes

## 2025-04-03 NOTE — HOSPITAL COURSE
Admitted under Hospital Medicine for NSTEMI, has been on heparin drip initially, status post cardiac catheterization on 04/03/2025--nonobstructive findings.  Patient also complaining of cough, recent diagnosis of UTI   COVID, flu negative   Chest x-ray negative for infiltrates   PT OT evaluated and recommended low intensity therapy   Received empiric antibiotics during hospital stay, cultures negative to date   04/04/2025   Patient appeared alert and oriented x3   Stated improvement in cough, denied fever, chills,; denied palpitations, any further episodes of chest tightness or chest pain   Afebrile, no leukocytosis   Received empiric antibiotics, breathing treatments, steroids with improvement in symptoms during hospital stay   Considering clinical and hemodynamic stability, planning to discharge patient today, emphasized on compliance with medications, outpatient follow up visits, patient expressed understanding, agreed with the plan   Ordered home health  Code status discussed, full code

## 2025-04-03 NOTE — ASSESSMENT & PLAN NOTE
Patient presents with NSTEMI. Chest pain is currently controlled. HODAN score is 6. Patient is currently on NSTEMI Pathway.    EKG reviewed. Troponins reviewed and results noted-   Recent Labs   Lab 04/03/25  0442   TROPONINI 0.260*   .     Lipid panel reviewed and shows-     Lab Results   Component Value Date    LDLCALC 58 08/20/2024     Lab Results   Component Value Date    TRIG 61 04/02/2025         Medical management includes; Dual Anti-Platelet therapy, High Intensity Statin, and ACE/ARB Echo has not been performed. Latest ECHO results are as follows- No results found for this or any previous visit.  .   Consult for cardiac rehab is ordered. Patient counseled on lifestyle modifications- follow a low fat, low cholesterol diet, attempt to lose weight, decrease or avoid alcohol intake, reduce salt in diet and cooking, reduce exposure to stress, and continue current medications. Cardiology is consulted. Plan of care reviewed with cardiology team. Continue to monitor patient closely and adjust therapy as needed.

## 2025-04-03 NOTE — ASSESSMENT & PLAN NOTE
Chronic, controlled.  Latest blood pressure and vitals reviewed-   Temp:  [97.4 °F (36.3 °C)-98.6 °F (37 °C)]   Pulse:  [43-59]   Resp:  [16-22]   BP: (124-185)/()   SpO2:  [95 %-100 %] .   Home meds for hypertension were reviewed and noted below.   Hypertension Medications              nitroGLYCERIN (NITROSTAT) 0.4 MG SL tablet Place 0.4 mg under the tongue.    valsartan (DIOVAN) 80 MG tablet Take 80 mg by mouth.            While in the hospital, will manage blood pressure as follows; Continue home antihypertensive regimen    Will utilize p.r.n. blood pressure medication only if patient's blood pressure greater than  160/90 and she develops symptoms such as worsening chest pain or shortness of breath.

## 2025-04-03 NOTE — ASSESSMENT & PLAN NOTE
CARLOS is likely due to pre-renal azotemia due to dehydration. Baseline creatinine is 1.6. Most recent creatinine and eGFR are listed below.  Recent Labs     04/02/25  1516   CREATININE 1.6*   EGFRNORACEVR 31*      Plan  - CARLOS is being treated  - Avoid nephrotoxins and renally dose meds for GFR listed above  - Monitor urine output, serial BMP, and adjust therapy as needed

## 2025-04-04 VITALS
DIASTOLIC BLOOD PRESSURE: 53 MMHG | OXYGEN SATURATION: 100 % | HEART RATE: 82 BPM | SYSTOLIC BLOOD PRESSURE: 102 MMHG | HEIGHT: 60 IN | WEIGHT: 156.94 LBS | BODY MASS INDEX: 30.81 KG/M2 | RESPIRATION RATE: 18 BRPM | TEMPERATURE: 98 F

## 2025-04-04 LAB
ABSOLUTE EOSINOPHIL (OHS): 0.07 K/UL
ABSOLUTE MONOCYTE (OHS): 0.38 K/UL (ref 0.3–1)
ABSOLUTE NEUTROPHIL COUNT (OHS): 3.67 K/UL (ref 1.8–7.7)
ANION GAP (OHS): 4 MMOL/L (ref 8–16)
BASOPHILS # BLD AUTO: 0 K/UL
BASOPHILS NFR BLD AUTO: 0 %
BUN SERPL-MCNC: 48 MG/DL (ref 8–23)
CALCIUM SERPL-MCNC: 9.7 MG/DL (ref 8.7–10.5)
CHLORIDE SERPL-SCNC: 111 MMOL/L (ref 95–110)
CO2 SERPL-SCNC: 23 MMOL/L (ref 23–29)
CREAT SERPL-MCNC: 1.8 MG/DL (ref 0.5–1.4)
ERYTHROCYTE [DISTWIDTH] IN BLOOD BY AUTOMATED COUNT: 12.5 % (ref 11.5–14.5)
GFR SERPLBLD CREATININE-BSD FMLA CKD-EPI: 27 ML/MIN/1.73/M2
GLUCOSE SERPL-MCNC: 95 MG/DL (ref 70–110)
HCT VFR BLD AUTO: 32.8 % (ref 37–48.5)
HGB BLD-MCNC: 10.5 GM/DL (ref 12–16)
IMM GRANULOCYTES # BLD AUTO: 0.02 K/UL (ref 0–0.04)
IMM GRANULOCYTES NFR BLD AUTO: 0.4 % (ref 0–0.5)
LYMPHOCYTES # BLD AUTO: 1.41 K/UL (ref 1–4.8)
MCH RBC QN AUTO: 28.2 PG (ref 27–31)
MCHC RBC AUTO-ENTMCNC: 32 G/DL (ref 32–36)
MCV RBC AUTO: 88 FL (ref 82–98)
NUCLEATED RBC (/100WBC) (OHS): 0 /100 WBC
OHS QRS DURATION: 102 MS
OHS QTC CALCULATION: 406 MS
PLATELET # BLD AUTO: 248 K/UL (ref 150–450)
PMV BLD AUTO: 10.1 FL (ref 9.2–12.9)
POCT GLUCOSE: 193 MG/DL (ref 70–110)
POCT GLUCOSE: 84 MG/DL (ref 70–110)
POTASSIUM SERPL-SCNC: 4.2 MMOL/L (ref 3.5–5.1)
PROCALCITONIN SERPL-MCNC: 0.06 NG/ML
RBC # BLD AUTO: 3.72 M/UL (ref 4–5.4)
RELATIVE EOSINOPHIL (OHS): 1.3 %
RELATIVE LYMPHOCYTE (OHS): 25.4 % (ref 18–48)
RELATIVE MONOCYTE (OHS): 6.8 % (ref 4–15)
RELATIVE NEUTROPHIL (OHS): 66.1 % (ref 38–73)
SODIUM SERPL-SCNC: 138 MMOL/L (ref 136–145)
WBC # BLD AUTO: 5.55 K/UL (ref 3.9–12.7)

## 2025-04-04 PROCEDURE — 84145 PROCALCITONIN (PCT): CPT | Performed by: STUDENT IN AN ORGANIZED HEALTH CARE EDUCATION/TRAINING PROGRAM

## 2025-04-04 PROCEDURE — 94640 AIRWAY INHALATION TREATMENT: CPT

## 2025-04-04 PROCEDURE — 85025 COMPLETE CBC W/AUTO DIFF WBC: CPT | Performed by: INTERNAL MEDICINE

## 2025-04-04 PROCEDURE — 94761 N-INVAS EAR/PLS OXIMETRY MLT: CPT

## 2025-04-04 PROCEDURE — 25000003 PHARM REV CODE 250: Performed by: NURSE PRACTITIONER

## 2025-04-04 PROCEDURE — 36415 COLL VENOUS BLD VENIPUNCTURE: CPT | Performed by: STUDENT IN AN ORGANIZED HEALTH CARE EDUCATION/TRAINING PROGRAM

## 2025-04-04 PROCEDURE — 93010 ELECTROCARDIOGRAM REPORT: CPT | Mod: ,,, | Performed by: INTERNAL MEDICINE

## 2025-04-04 PROCEDURE — 63600175 PHARM REV CODE 636 W HCPCS: Performed by: STUDENT IN AN ORGANIZED HEALTH CARE EDUCATION/TRAINING PROGRAM

## 2025-04-04 PROCEDURE — 97116 GAIT TRAINING THERAPY: CPT

## 2025-04-04 PROCEDURE — 97530 THERAPEUTIC ACTIVITIES: CPT

## 2025-04-04 PROCEDURE — 99233 SBSQ HOSP IP/OBS HIGH 50: CPT | Mod: FS,,, | Performed by: INTERNAL MEDICINE

## 2025-04-04 PROCEDURE — 25000003 PHARM REV CODE 250: Performed by: STUDENT IN AN ORGANIZED HEALTH CARE EDUCATION/TRAINING PROGRAM

## 2025-04-04 PROCEDURE — 80048 BASIC METABOLIC PNL TOTAL CA: CPT | Performed by: INTERNAL MEDICINE

## 2025-04-04 PROCEDURE — 97162 PT EVAL MOD COMPLEX 30 MIN: CPT

## 2025-04-04 PROCEDURE — 25000242 PHARM REV CODE 250 ALT 637 W/ HCPCS: Performed by: STUDENT IN AN ORGANIZED HEALTH CARE EDUCATION/TRAINING PROGRAM

## 2025-04-04 PROCEDURE — 97166 OT EVAL MOD COMPLEX 45 MIN: CPT

## 2025-04-04 PROCEDURE — 36415 COLL VENOUS BLD VENIPUNCTURE: CPT | Performed by: INTERNAL MEDICINE

## 2025-04-04 PROCEDURE — 93005 ELECTROCARDIOGRAM TRACING: CPT

## 2025-04-04 PROCEDURE — 25000003 PHARM REV CODE 250: Performed by: INTERNAL MEDICINE

## 2025-04-04 RX ORDER — IPRATROPIUM BROMIDE AND ALBUTEROL SULFATE 2.5; .5 MG/3ML; MG/3ML
3 SOLUTION RESPIRATORY (INHALATION)
Status: DISCONTINUED | OUTPATIENT
Start: 2025-04-04 | End: 2025-04-04 | Stop reason: HOSPADM

## 2025-04-04 RX ORDER — ALBUTEROL SULFATE 90 UG/1
2 INHALANT RESPIRATORY (INHALATION) EVERY 6 HOURS PRN
Qty: 8.5 G | Refills: 0 | Status: SHIPPED | OUTPATIENT
Start: 2025-04-04 | End: 2025-05-04

## 2025-04-04 RX ORDER — LEVOFLOXACIN 500 MG/1
500 TABLET, FILM COATED ORAL EVERY OTHER DAY
Status: DISCONTINUED | OUTPATIENT
Start: 2025-04-05 | End: 2025-04-04 | Stop reason: HOSPADM

## 2025-04-04 RX ORDER — ISOSORBIDE MONONITRATE 30 MG/1
30 TABLET, EXTENDED RELEASE ORAL DAILY
Status: DISCONTINUED | OUTPATIENT
Start: 2025-04-04 | End: 2025-04-04 | Stop reason: HOSPADM

## 2025-04-04 RX ORDER — METHYLPREDNISOLONE 4 MG/1
TABLET ORAL
Qty: 21 EACH | Refills: 0 | Status: SHIPPED | OUTPATIENT
Start: 2025-04-04 | End: 2025-04-25

## 2025-04-04 RX ORDER — BENZONATATE 100 MG/1
100 CAPSULE ORAL 3 TIMES DAILY PRN
Qty: 15 CAPSULE | Refills: 0 | Status: SHIPPED | OUTPATIENT
Start: 2025-04-04 | End: 2025-04-09

## 2025-04-04 RX ORDER — PREDNISONE 20 MG/1
40 TABLET ORAL DAILY
Status: DISCONTINUED | OUTPATIENT
Start: 2025-04-04 | End: 2025-04-04 | Stop reason: HOSPADM

## 2025-04-04 RX ORDER — LEVOFLOXACIN 250 MG/1
250 TABLET ORAL DAILY
Qty: 3 TABLET | Refills: 0 | Status: SHIPPED | OUTPATIENT
Start: 2025-04-04 | End: 2025-04-07

## 2025-04-04 RX ORDER — BUDESONIDE 0.5 MG/2ML
0.5 INHALANT ORAL EVERY 12 HOURS
Status: DISCONTINUED | OUTPATIENT
Start: 2025-04-04 | End: 2025-04-04 | Stop reason: HOSPADM

## 2025-04-04 RX ORDER — ISOSORBIDE MONONITRATE 30 MG/1
30 TABLET, EXTENDED RELEASE ORAL DAILY
Qty: 30 TABLET | Refills: 0 | Status: SHIPPED | OUTPATIENT
Start: 2025-04-05 | End: 2025-05-05

## 2025-04-04 RX ADMIN — ATORVASTATIN CALCIUM 40 MG: 40 TABLET, FILM COATED ORAL at 10:04

## 2025-04-04 RX ADMIN — VALSARTAN 80 MG: 40 TABLET, FILM COATED ORAL at 10:04

## 2025-04-04 RX ADMIN — IPRATROPIUM BROMIDE AND ALBUTEROL SULFATE 3 ML: 2.5; .5 SOLUTION RESPIRATORY (INHALATION) at 12:04

## 2025-04-04 RX ADMIN — ISOSORBIDE MONONITRATE 30 MG: 30 TABLET, EXTENDED RELEASE ORAL at 10:04

## 2025-04-04 RX ADMIN — PREDNISONE 40 MG: 20 TABLET ORAL at 10:04

## 2025-04-04 RX ADMIN — GUAIFENESIN AND DEXTROMETHORPHAN HYDROBROMIDE 1 TABLET: 600; 30 TABLET, EXTENDED RELEASE ORAL at 10:04

## 2025-04-04 RX ADMIN — ASPIRIN 81 MG: 81 TABLET, CHEWABLE ORAL at 10:04

## 2025-04-04 RX ADMIN — LEVOTHYROXINE SODIUM 50 MCG: 0.05 TABLET ORAL at 05:04

## 2025-04-04 RX ADMIN — BENZONATATE 100 MG: 100 CAPSULE ORAL at 05:04

## 2025-04-04 NOTE — ASSESSMENT & PLAN NOTE
-Patient with history of CAD s/p remote LAD stenting who presents with substernal chest tightness/pressure--->NSTEMI  -Troponin 0.244>0.271>0.302>0.298>0.260  -Continue Plavix, ARB, statin  -No BB given bradycardia  -Continue heparin gtt  -TTE pending  -[facility] today by Dr. Quiroz    4/4/2025  -[facility] yesterday with patent LAD stent, non-obs CAD  --Resume Plavix  -Continue ARB, statin  -Imdur added  -TTE with normal EF

## 2025-04-04 NOTE — ASSESSMENT & PLAN NOTE
Chronic, controlled.  Latest blood pressure and vitals reviewed-   Temp:  [97.6 °F (36.4 °C)-98.5 °F (36.9 °C)]   Pulse:  [52-96]   Resp:  [16-18]   BP: (101-165)/(52-83)   SpO2:  [95 %-100 %] .   Home meds for hypertension were reviewed and noted below.   Hypertension Medications              nitroGLYCERIN (NITROSTAT) 0.4 MG SL tablet Place 0.4 mg under the tongue.    valsartan (DIOVAN) 80 MG tablet Take 80 mg by mouth.            While in the hospital, will manage blood pressure as follows; Continue home antihypertensive regimen    Will utilize p.r.n. blood pressure medication only if patient's blood pressure greater than  160/90 and she develops symptoms such as worsening chest pain or shortness of breath.

## 2025-04-04 NOTE — PLAN OF CARE
See eval for details. Pt displayed deficits with functional mobility/ transfers, deficits with adl's skills also decrease b ue strength/endurance. Recommendation: LOW INTENSITY WITH SUPV AND ROLLING WALKER

## 2025-04-04 NOTE — PLAN OF CARE
"A228/A228 DANY  Lalilandry Floyd is a 89 y.o.female admitted on 4/2/2025 for NSTEMI (non-ST elevated myocardial infarction)   Code Status: Full Code MRN: [id number]   Review of patient's allergies indicates:   Allergen Reactions    Codeine      "stomach cramps"    Morphine      Past Medical History:   Diagnosis Date    Arthritis     Cancer     Coronary artery disease     Depression     Diabetes mellitus     History of heart artery stent 2005    Hypertension     MI (myocardial infarction) 2005    Renal disorder     Thyroid disease       PRN meds    acetaminophen, 650 mg, Q4H PRN  benzonatate, 100 mg, TID PRN  hydrALAZINE, 10 mg, Q6H PRN  melatonin, 6 mg, Nightly PRN  nitroGLYCERIN, 0.4 mg, Q5 Min PRN  ondansetron, 8 mg, Q8H PRN  ondansetron, 4 mg, Q6H PRN      AVS Discharge instructions received and reviewed with pt and family at bedside.  Pt voiced understanding and all questions answered to satisfaction.  Stressed importance to making and keeping all follow up appointments.  Medications at bedside and reviewed with pt.  Tele monitor removed and brought to monitor tech.  IV d/c'd with tip intact, pressure dressing applied.  Pt will call when ready to be transported to front of hospital via w/c to be discharged home.      Orientation: oriented x 4  [place] Coma Scale Score: 15     Lead Monitored: Lead II Rhythm: normal sinus rhythm    Cardiac/Telemetry Box Number: [id number]  VTE Core Measure: Pharmacological prophylaxis initiated/maintained Last Bowel Movement: 04/02/25  Diet Heart Healthy Standard Tray     Jack Score: 18  Fall Risk Score: 10  Accucheck [x]   Freq?      Lines/Drains/Airways       None                      Problem: Adult Inpatient Plan of Care  Goal: Plan of Care Review  Outcome: Met  Goal: Patient-Specific Goal (Individualized)  Outcome: Met  Goal: Absence of Hospital-Acquired Illness or Injury  Outcome: Met  Goal: Optimal Comfort and Wellbeing  Outcome: Met  Goal: Readiness for Transition of Care  Outcome: " Met    Problem: Acute Coronary Syndrome  Goal: Optimal Adaptation to Illness  Outcome: Met  Goal: Absence of Cardiac-Related Pain  Outcome: Met  Goal: Normalized Cardiac Rhythm  Outcome: Met  Goal: Effective Cardiac Pump Function  Outcome: Met  Goal: Adequate Tissue Perfusion  Outcome: Met     Problem: Cardiac Catheterization (Diagnostic/Interventional)  Goal: Absence of Bleeding  Outcome: Met  Goal: Absence of Contrast-Induced Injury  Outcome: Met  Goal: Stable Heart Rate and Rhythm  Outcome: Met  Goal: Absence of Embolism Signs and Symptoms  Outcome: Met  Goal: Anesthesia/Sedation Recovery  Outcome: Met  Goal: Optimal Pain Control and Function  Outcome: Met  Goal: Absence of Vascular Access Complication  Outcome: Met     Problem: Fall Injury Risk  Goal: Absence of Fall and Fall-Related Injury  Outcome: Met     Problem: Skin Injury Risk Increased  Goal: Skin Health and Integrity  Outcome: Met     Problem: Acute Kidney Injury/Impairment  Goal: Fluid and Electrolyte Balance  Outcome: Met  Goal: Improved Oral Intake  Outcome: Met  Goal: Effective Renal Function  Outcome: Met     Problem: Diabetes Comorbidity  Goal: Blood Glucose Level Within Targeted Range  Outcome: Met

## 2025-04-04 NOTE — HOSPITAL COURSE
4/4/2025-Patient seen and examined today, s/p [facility] yesterday which showed patent LAD stent, non-obs CAD. Feels ok. No recurrent CP. Reports mild cough. Fatigued. Labs reviewed. Creatinine 1.8. Imdur added to med regimen. Can f/u with primary cardiologist.

## 2025-04-04 NOTE — ASSESSMENT & PLAN NOTE
CARLOS is likely due to pre-renal azotemia due to dehydration. Baseline creatinine is 1.6. Most recent creatinine and eGFR are listed below.  Recent Labs     04/02/25  1516 04/03/25  0442 04/04/25  0425   CREATININE 1.6* 1.5* 1.8*   EGFRNORACEVR 31* 34* 27*      Plan  - CARLOS is being treated  - Avoid nephrotoxins and renally dose meds for GFR listed above  - Monitor urine output, serial BMP, and adjust therapy as needed

## 2025-04-04 NOTE — DISCHARGE SUMMARY
[facility] Medicine  Discharge Summary      Patient Name: Lali Floyd  MRN: [id number]  SUZANNA: [id number]  Patient Class: IP- Inpatient  Admission Date: 4/2/2025  Hospital Length of Stay: 1 days  Discharge Date and Time: 04/04/2025 5:41 PM  Attending Physician: Flor att. providers found   Discharging Provider: Evelyn Enrique MD  Primary Care Provider: Blayne Santos MD    Primary Care Team: Networked reference to record PCT     HPI:   Lali Flyod is a 89 y.o. female with a PMH  has a past medical history of Arthritis, Cancer, Coronary artery disease, Depression, Diabetes mellitus, History of heart artery stent (2005), Hypertension, MI (myocardial infarction) (2005), Renal disorder, and Thyroid disease.  Presented to the ER for evaluation of substernal chest tightness which began around 8:00 a.m. this morning.  Patient reports she was diagnosed with an upper respiratory infection on week ago.  Patient reports she was feeling better on Friday and went to go run errands to have hair done to go to awake on Saturday morning.  Since Sunday patient's been feeling generally weak/fatigued with new onset of chest tightness that began this morning upon waking up.  Patient is followed by Dr. Dempsey with Cardiology and states that she had a mi in 2005 which required 2 stents to be placed.  Was last evaluated by Dr. Dempsey few months ago and was informed that everything looked okay.  Last stress test was performed between 6-10 years ago.  States that her chest tightness that brought her into the ER this evening is not similar to when she had her mi stent placement back in 2005.  Denies any chest pain currently.  Denies any other associated symptoms such as lightheadedness, dizziness, palpitations, shortness of breath, dysuria, hematuria, fever, aches, chills, sweats.      ER workup revealed CBC to be unremarkable.  CMP with a BUN/creatinine of 46/1.6 with EGFR of 31.  CPK within  normal limits.  Initial troponin 0.244 with repeat troponin 0.271.  Chest x-ray negative for acute findings.  EKG revealed sinus Sudarshan with a ventricular rate of 48 beats per minute with a QT/QTC of 414/369.  UA revealed +1 leukocyte esterase, 3 WBCs, rare bacteria.  Patient received 1 g Tylenol, 81 mg aspirin, 0.5 mg Ativan, and initiated on heparin drip after ER provider spoke with on-call cardiologist.  Patient placed on NSTEMI pathway.  Hospital Medicine consulted to admit patient for NSTEMI and UTI.  Patient in agreement with treatment plan.  Patient admitted under observation status.    PCP: Blayne Santos      Procedure(s) (LRB):  Left heart cath (Left)      Hospital Course:   Admitted under Hospital Medicine for NSTEMI, has been on heparin drip initially, status post cardiac catheterization on 04/03/2025--nonobstructive findings.  Patient also complaining of cough, recent diagnosis of UTI   COVID, flu negative   Chest x-ray negative for infiltrates   PT OT evaluated and recommended low intensity therapy   Received empiric antibiotics during hospital stay, cultures negative to date   04/04/2025   Patient appeared alert and oriented x3   Stated improvement in cough, denied fever, chills,; denied palpitations, any further episodes of chest tightness or chest pain   Afebrile, no leukocytosis   Received empiric antibiotics, breathing treatments, steroids with improvement in symptoms during hospital stay   Considering clinical and hemodynamic stability, planning to discharge patient today, emphasized on compliance with medications, outpatient follow up visits, patient expressed understanding, agreed with the plan   Ordered home health  Code status discussed, full code     Goals of Care Treatment Preferences:  Code Status: Full Code      SDOH Screening:  The patient declined to be screened for utility difficulties, food insecurity, transport difficulties, housing insecurity, and interpersonal safety, so no concerns  could be identified this admission.     Consults:   Consults (From admission, onward)          Status Ordering Provider     Inpatient consult to Registered Dietitian/Nutritionist  Once        Provider:  (Not yet assigned)    Completed ALYSA SEAMAN     Inpatient consult to Social Work/Case Management  Once        Provider:  (Not yet assigned)    Completed ALYSA SEAMAN     Inpatient consult to Cardiology  Once        Provider:  Dipesh Blackmon MD    Completed SOILA HOLLINGSWORTH JR            Assessment & Plan  NSTEMI (non-ST elevated myocardial infarction)  Patient presents with NSTEMI. Chest pain is currently controlled. HODAN score is 6. Patient is currently on NSTEMI Pathway.    EKG reviewed. Troponins reviewed and results noted-   Recent Labs   Lab 04/03/25  0442   TROPONINI 0.260*   .     Lipid panel reviewed and shows-     Lab Results   Component Value Date    LDLCALC 58 08/20/2024     Lab Results   Component Value Date    TRIG 61 04/02/2025         Medical management includes; Dual Anti-Platelet therapy, High Intensity Statin, and ACE/ARB Echo has not been performed. Latest ECHO results are as follows- No results found for this or any previous visit.  .   Consult for cardiac rehab is ordered. Patient counseled on lifestyle modifications- follow a low fat, low cholesterol diet, attempt to lose weight, decrease or avoid alcohol intake, reduce salt in diet and cooking, reduce exposure to stress, and continue current medications. Cardiology is consulted. Plan of care reviewed with cardiology team. Continue to monitor patient closely and adjust therapy as needed.      Acute cystitis  Urinalysis revealed:   Lab Results   Component Value Date    COLORU Colorless (A) 04/02/2025    APPEARANCEUA Clear 04/02/2025    SPECGRAV 1.010 04/02/2025    PHUR 6.0 04/02/2025    PROTEINUA Negative 04/02/2025    GLUCUA Negative 04/02/2025    KETONESU Negative 05/07/2017    NITRITE Negative 04/02/2025    UROBILINOGEN Negative  04/02/2025    BILIRUBINUA Negative 04/02/2025    LEUKOCYTESUR 1+ (A) 04/02/2025    RBCUA 5 (H) 03/29/2025    WBCUA 3 04/02/2025    BACTERIA Rare 04/02/2025    HYALINECASTS 2 (H) 04/02/2025   Diagnosed with UTI on 03/30/2025.  Compliant with levofloxacin 250 mg p.o. daily.  Plan:  -UA culture pending  -Continue Abx              CARLOS (acute kidney injury)  CARLOS is likely due to pre-renal azotemia due to dehydration. Baseline creatinine is  1.6 . Most recent creatinine and eGFR are listed below.  Recent Labs     04/02/25  1516 04/03/25  0442 04/04/25  0425   CREATININE 1.6* 1.5* 1.8*   EGFRNORACEVR 31* 34* 27*      Plan  - CARLOS is  being treated  - Avoid nephrotoxins and renally dose meds for GFR listed above  - Monitor urine output, serial BMP, and adjust therapy as needed    Essential hypertension  Chronic, controlled.  Latest blood pressure and vitals reviewed-   Temp:  [97.6 °F (36.4 °C)-98.5 °F (36.9 °C)]   Pulse:  [52-96]   Resp:  [16-18]   BP: (101-165)/(52-83)   SpO2:  [95 %-100 %] .   Home meds for hypertension were reviewed and noted below.   Hypertension Medications              nitroGLYCERIN (NITROSTAT) 0.4 MG SL tablet Place 0.4 mg under the tongue.    valsartan (DIOVAN) 80 MG tablet Take 80 mg by mouth.            While in the hospital, will manage blood pressure as follows; Continue home antihypertensive regimen    Will utilize p.r.n. blood pressure medication only if patient's blood pressure greater than  160/90 and she develops symptoms such as worsening chest pain or shortness of breath.    Hypothyroidism  Patient has chronic hypothyroidism. TFTs reviewed-   Lab Results   Component Value Date    TSH 1.05 09/05/2024   . Will continue chronic levothyroxine and adjust for and clinical changes.      Dyslipidemia due to type 2 diabetes mellitus  Patient is chronically on statin.will continue for now. Last Lipid Panel:   Lab Results   Component Value Date    CHOL 150 04/02/2025    HDL 51 04/02/2025    LDLCALC  "58 08/20/2024    TRIG 61 04/02/2025    CHOLHDL 34.0 04/02/2025     Plan:  -Continue home medication  -low fat/low calorie diet      Final Active Diagnoses:    Diagnosis Date Noted POA    PRINCIPAL PROBLEM:  NSTEMI (non-ST elevated myocardial infarction) [I21.4] 04/03/2025 Unknown    Acute cystitis [N30.00] 04/03/2025 Unknown    Dyslipidemia due to type 2 diabetes mellitus [E11.69, E78.5] 04/03/2025 Yes    Hypothyroidism [E03.9] 04/03/2025 Yes    CARLOS (acute kidney injury) [N17.9] 04/03/2025 Unknown    Essential hypertension [I10] 06/03/2013 Yes      Problems Resolved During this Admission:       Discharged Condition: fair    Disposition: Home or Self Care    Follow Up:   Contact information for follow-up providers       Blayne Santos MD Follow up in 1 week(s).    Specialty: Internal Medicine  Contact information:  [address] [place] [address] [phone]                       Contact information for after-discharge care       Home Medical Care       [facility] .    Service: Home Health Services  Contact information:  [address] [facility] [address] [phone]                                 Patient Instructions:      WALKER FOR HOME USE     Order Specific Question Answer Comments   Type of Walker: Osmel (4'4"-5'6")    With wheels? Yes    Height: 5' (1.524 m)    Weight: 71.2 kg (156 lb 15.5 oz)    Length of need (1-99 months): 99    Does patient have medical equipment at home? bath bench    Please check all that apply: Patient's condition impairs ambulation.    Please check all that apply: Walker will be used for gait training.    Please check all that apply: Patient is unable to safely ambulate without equipment.    Please check all that apply: Patient needs help to get in and out of chair.      WALKER FOR HOME USE     Order Specific Question Answer Comments   Type of Walker: Adult (5'4"-6'6")    With wheels? Yes    Height: 5' (1.524 m)  "   Weight: 71.2 kg (156 lb 15.5 oz)    Length of need (1-99 months): 99    Does patient have medical equipment at home? bath bench    Please check all that apply: Patient's condition impairs ambulation.    Please check all that apply: Patient is unable to safely ambulate without equipment.    Please check all that apply: Altered sensory perception.    Please check all that apply: Patient needs help to get in and out of chair.    Please check all that apply: Walker will be used for gait training.        Significant Diagnostic Studies:     Results for orders placed or performed during the hospital encounter of 04/02/25   ABORH RETYPE    Collection Time: 04/02/25  3:09 PM   Result Value Ref Range    ABORH Retype A POS    Urinalysis, Reflex to Urine Culture Urine, Clean Catch    Collection Time: 04/02/25  3:10 PM    Specimen: Urine   Result Value Ref Range    Color, UA Colorless (A) Straw, Keke, Yellow, Light-Orange    Appearance, UA Clear Clear    pH, UA 6.0 5.0 - 8.0    Spec Grav UA 1.010 1.005 - 1.030    Protein, UA Negative Negative    Glucose, UA Negative Negative    Ketones, UA Negative Negative    Bilirubin, UA Negative Negative    Blood, UA Negative Negative    Nitrites, UA Negative Negative    Urobilinogen, UA Negative <2.0 EU/dL    Leukocyte Esterase, UA 1+ (A) Negative   Urinalysis Microscopic    Collection Time: 04/02/25  3:10 PM   Result Value Ref Range    WBC, UA 3 0 - 5 /HPF    Bacteria, UA Rare None, Rare, Occasional /HPF    Hyaline Casts, UA 2 (H) 0 - 1 /LPF    Microscopic Comment     Comprehensive metabolic panel    Collection Time: 04/02/25  3:16 PM   Result Value Ref Range    Sodium 140 136 - 145 mmol/L    Potassium 4.5 3.5 - 5.1 mmol/L    Chloride 109 95 - 110 mmol/L    CO2 21 (L) 23 - 29 mmol/L    Glucose 75 70 - 110 mg/dL    BUN 46 (H) 8 - 23 mg/dL    Creatinine 1.6 (H) 0.5 - 1.4 mg/dL    Calcium 10.7 (H) 8.7 - 10.5 mg/dL    Protein Total 7.7 6.0 - 8.4 gm/dL    Albumin 3.9 3.5 - 5.2 g/dL     Bilirubin Total 0.4 0.1 - 1.0 mg/dL     40 - 150 unit/L    AST 20 11 - 45 unit/L    ALT 20 10 - 44 unit/L    Anion Gap 10 8 - 16 mmol/L    eGFR 31 (L) >60 mL/min/1.73/m2   CPK    Collection Time: 04/02/25  3:16 PM   Result Value Ref Range     20 - 180 U/L   Troponin I    Collection Time: 04/02/25  3:16 PM   Result Value Ref Range    Troponin-I 0.244 (H) <=0.026 ng/mL   CBC with Differential    Collection Time: 04/02/25  3:16 PM   Result Value Ref Range    WBC 8.83 3.90 - 12.70 K/uL    RBC 3.98 (L) 4.00 - 5.40 M/uL    HGB 11.1 (L) 12.0 - 16.0 gm/dL    HCT 34.3 (L) 37.0 - 48.5 %    MCV 86 82 - 98 fL    MCH 27.9 27.0 - 31.0 pg    MCHC 32.4 32.0 - 36.0 g/dL    RDW 12.6 11.5 - 14.5 %    Platelet Count 289 150 - 450 K/uL    MPV 9.8 9.2 - 12.9 fL    Nucleated RBC 0 <=0 /100 WBC    Neut % 80.5 (H) 38 - 73 %    Lymph % 13.1 (L) 18 - 48 %    Mono % 5.5 4 - 15 %    Eos % 0.1 <=8 %    Basophil % 0.1 <=1.9 %    Imm Grans % 0.7 (H) 0.0 - 0.5 %    Neut # 7.10 1.8 - 7.7 K/uL    Lymph # 1.16 1 - 4.8 K/uL    Mono # 0.49 0.3 - 1 K/uL    Eos # 0.01 <=0.5 K/uL    Baso # 0.01 <=0.2 K/uL    Imm Grans # 0.06 (H) 0.00 - 0.04 K/uL   Troponin I    Collection Time: 04/02/25  6:42 PM   Result Value Ref Range    Troponin-I 0.271 (H) <=0.026 ng/mL   APTT    Collection Time: 04/02/25  6:42 PM   Result Value Ref Range    PTT 21.7 21.0 - 32.0 seconds   Protime-INR    Collection Time: 04/02/25  6:42 PM   Result Value Ref Range    PT 11.5 9.0 - 12.5 seconds    INR 1.0 0.8 - 1.2   Lipid panel    Collection Time: 04/02/25  9:20 PM   Result Value Ref Range    Cholesterol Total 150 120 - 199 mg/dL    Triglyceride 61 30 - 150 mg/dL    HDL Cholesterol 51 40 - 75 mg/dL    LDL Cholesterol 86.8 63.0 - 159.0 mg/dL    HDL/Cholesterol Ratio 34.0 20.0 - 50.0 %    Cholesterol/HDL Ratio 2.9 2.0 - 5.0    Non HDL Cholesterol 99 mg/dL   Hemoglobin A1c    Collection Time: 04/02/25  9:20 PM   Result Value Ref Range    Hemoglobin A1c 6.5 (H) 4.0 - 5.6 %     Estimated Average Glucose 140 (H) 68 - 131 mg/dL   Troponin I    Collection Time: 04/02/25  9:20 PM   Result Value Ref Range    Troponin-I 0.302 (H) <=0.026 ng/mL   Type & Screen    Collection Time: 04/02/25  9:20 PM   Result Value Ref Range    Specimen Outdate 04/05/2025 23:59     Group & Rh A POS     Indirect Vicente NEG    APTT    Collection Time: 04/03/25  1:21 AM   Result Value Ref Range    .9 (H) 21.0 - 32.0 seconds   Troponin I    Collection Time: 04/03/25  1:21 AM   Result Value Ref Range    Troponin-I 0.298 (H) <=0.026 ng/mL   EKG 12-lead    Collection Time: 04/03/25  3:30 AM   Result Value Ref Range    QRS Duration 100 ms    OHS QTC Calculation 398 ms   Troponin I    Collection Time: 04/03/25  4:42 AM   Result Value Ref Range    Troponin-I 0.260 (H) <=0.026 ng/mL   CBC with Differential    Collection Time: 04/03/25  4:42 AM   Result Value Ref Range    WBC 5.35 3.90 - 12.70 K/uL    RBC 3.68 (L) 4.00 - 5.40 M/uL    HGB 10.3 (L) 12.0 - 16.0 gm/dL    HCT 31.5 (L) 37.0 - 48.5 %    MCV 86 82 - 98 fL    MCH 28.0 27.0 - 31.0 pg    MCHC 32.7 32.0 - 36.0 g/dL    RDW 12.8 11.5 - 14.5 %    Platelet Count 246 150 - 450 K/uL    MPV 10.1 9.2 - 12.9 fL    Nucleated RBC 0 <=0 /100 WBC    Neut % 57.4 38 - 73 %    Lymph % 33.5 18 - 48 %    Mono % 7.5 4 - 15 %    Eos % 0.7 <=8 %    Basophil % 0.2 <=1.9 %    Imm Grans % 0.7 (H) 0.0 - 0.5 %    Neut # 3.07 1.8 - 7.7 K/uL    Lymph # 1.79 1 - 4.8 K/uL    Mono # 0.40 0.3 - 1 K/uL    Eos # 0.04 <=0.5 K/uL    Baso # 0.01 <=0.2 K/uL    Imm Grans # 0.04 0.00 - 0.04 K/uL   APTT    Collection Time: 04/03/25  4:42 AM   Result Value Ref Range    PTT 33.6 (H) 21.0 - 32.0 seconds   Basic metabolic panel    Collection Time: 04/03/25  4:42 AM   Result Value Ref Range    Sodium 139 136 - 145 mmol/L    Potassium 4.0 3.5 - 5.1 mmol/L    Chloride 111 (H) 95 - 110 mmol/L    CO2 21 (L) 23 - 29 mmol/L    Glucose 72 70 - 110 mg/dL    BUN 48 (H) 8 - 23 mg/dL    Creatinine 1.5 (H) 0.5 - 1.4 mg/dL     Calcium 9.6 8.7 - 10.5 mg/dL    Anion Gap 7 (L) 8 - 16 mmol/L    eGFR 34 (L) >60 mL/min/1.73/m2   Brain natriuretic peptide    Collection Time: 04/03/25  4:42 AM   Result Value Ref Range    BNP 79 0 - 99 pg/mL   Echo    Collection Time: 04/03/25 10:00 AM   Result Value Ref Range    LVOT stroke volume 49.9 cm3    LVIDd 3.9 3.5 - 6.0 cm    LV Systolic Volume 26 mL    LV Systolic Volume Index 15.7 mL/m2    LVIDs 2.7 2.1 - 4.0 cm    LV Diastolic Volume 67 mL    LV Diastolic Volume Index 40.36 mL/m2    Left Ventricular End Systolic Volume by Teichholz Method 25.87 mL    Left Ventricular End Diastolic Volume by Teichholz Method 66.92 mL    IVS 1.1 0.6 - 1.1 cm    LVOT diameter 1.8 cm    LVOT area 2.5 cm2    FS 30.8 28 - 44 %    Left Ventricle Relative Wall Thickness 0.56 cm    PW 1.1 0.6 - 1.1 cm    LV mass 140.1 g    LV Mass Index 84.4 g/m2    MV Peak E Dante 0.83 m/s    TDI LATERAL 0.10 m/s    TDI SEPTAL 0.06 m/s    E/E' ratio 10 m/s    MV Peak A Dante 0.94 m/s    TR Max Dante 2.0 m/s    E/A ratio 0.88     IVRT 60 msec    E wave deceleration time 191 msec    LV SEPTAL E/E' RATIO 13.8 m/s    LV LATERAL E/E' RATIO 8.3 m/s    LVOT peak dante 0.8 m/s    Left Ventricular Outflow Tract Mean Velocity 0.58 cm/s    Left Ventricular Outflow Tract Mean Gradient 1.44 mmHg    RVOT peak VTI 15.5 cm    TAPSE 1.88 cm    LA size 3.3 cm    Left Atrium Minor Axis 4.4 cm    Left Atrium Major Axis 4.5 cm    RA Major Axis 3.94 cm    AV mean gradient 4 mmHg    AV peak gradient 8 mmHg    Ao peak dante 1.4 m/s    Ao VTI 29.9 cm    LVOT peak VTI 19.6 cm    AV valve area 1.7 cm²    AV Velocity Ratio 0.57     AV index (prosthetic) 0.66     LESLEY by Velocity Ratio 1.5 cm²    Mr max dante 4.42 m/s    MV stenosis pressure 1/2 time 55.49 ms    MV valve area p 1/2 method 3.96 cm2    Triscuspid Valve Regurgitation Peak Gradient 16 mmHg    PV mean gradient 1 mmHg    RVOT peak dante 0.65 m/s    Ao root annulus 2.66 cm    STJ 2.70 cm    Ascending aorta 2.58 cm    IVC  diameter 1.97 cm    Mean e' 0.08 m/s    ZLVIDS -0.51     ZLVIDD -1.76     MARYELLEN 25 mL/m2    LA Vol 41 cm3    LA WIDTH 3.3 cm    RA Width 2.6 cm    TV resting pulmonary artery pressure 19 mmHg    RV TB RVSP 5 mmHg    Est. RA pres 3 mmHg   Cardiac catheterization    Collection Time: 04/03/25 12:19 PM   Result Value Ref Range    Cath EF Quantitative 60 %   POCT glucose    Collection Time: 04/03/25  5:03 PM   Result Value Ref Range    POCT Glucose 80 70 - 110 mg/dL   POCT glucose    Collection Time: 04/03/25  9:07 PM   Result Value Ref Range    POCT Glucose 90 70 - 110 mg/dL   Basic metabolic panel    Collection Time: 04/04/25  4:25 AM   Result Value Ref Range    Sodium 138 136 - 145 mmol/L    Potassium 4.2 3.5 - 5.1 mmol/L    Chloride 111 (H) 95 - 110 mmol/L    CO2 23 23 - 29 mmol/L    Glucose 95 70 - 110 mg/dL    BUN 48 (H) 8 - 23 mg/dL    Creatinine 1.8 (H) 0.5 - 1.4 mg/dL    Calcium 9.7 8.7 - 10.5 mg/dL    Anion Gap 4 (L) 8 - 16 mmol/L    eGFR 27 (L) >60 mL/min/1.73/m2   CBC with Differential    Collection Time: 04/04/25  4:25 AM   Result Value Ref Range    WBC 5.55 3.90 - 12.70 K/uL    RBC 3.72 (L) 4.00 - 5.40 M/uL    HGB 10.5 (L) 12.0 - 16.0 gm/dL    HCT 32.8 (L) 37.0 - 48.5 %    MCV 88 82 - 98 fL    MCH 28.2 27.0 - 31.0 pg    MCHC 32.0 32.0 - 36.0 g/dL    RDW 12.5 11.5 - 14.5 %    Platelet Count 248 150 - 450 K/uL    MPV 10.1 9.2 - 12.9 fL    Nucleated RBC 0 <=0 /100 WBC    Neut % 66.1 38 - 73 %    Lymph % 25.4 18 - 48 %    Mono % 6.8 4 - 15 %    Eos % 1.3 <=8 %    Basophil % 0.0 <=1.9 %    Imm Grans % 0.4 0.0 - 0.5 %    Neut # 3.67 1.8 - 7.7 K/uL    Lymph # 1.41 1 - 4.8 K/uL    Mono # 0.38 0.3 - 1 K/uL    Eos # 0.07 <=0.5 K/uL    Baso # 0.00 <=0.2 K/uL    Imm Grans # 0.02 0.00 - 0.04 K/uL   EKG 12-lead    Collection Time: 04/04/25  4:28 AM   Result Value Ref Range    QRS Duration 102 ms    OHS QTC Calculation 406 ms   POCT glucose    Collection Time: 04/04/25  5:09 AM   Result Value Ref Range    POCT Glucose 84  70 - 110 mg/dL   Procalcitonin    Collection Time: 04/04/25 10:29 AM   Result Value Ref Range    Procalcitonin 0.06 <0.25 ng/mL   POCT glucose    Collection Time: 04/04/25  1:03 PM   Result Value Ref Range    POCT Glucose 193 (H) 70 - 110 mg/dL        Imaging Results              X-Ray Chest 1 View (Final result)  Result time 04/02/25 14:54:15      Final result by Cherry Harp MD (Timothy) (04/02/25 14:54:15)                   Impression:      No infiltrates      Electronically signed by: Cherry Harp MD  Date:    04/02/2025  Time:    14:54               Narrative:    EXAMINATION:  XR CHEST 1 VIEW    CLINICAL HISTORY:  Shortness of breath,    COMPARISON:  Chest, 03/29/2025    FINDINGS:  One view.  Stable heart size.  The lung fields are clear. No acute cardiopulmonary infiltrate.                                       Pending Diagnostic Studies:       None           Medications:       Medication List        START taking these medications      benzonatate 100 MG capsule  Commonly known as: TESSALON  Take 1 capsule (100 mg total) by mouth 3 (three) times daily as needed for Cough.     isosorbide mononitrate 30 MG 24 hr tablet  Commonly known as: IMDUR  Take 1 tablet (30 mg total) by mouth once daily.  Start taking on: April 5, 2025     MUCINEX DM  mg per 12 hr tablet  Generic drug: dextromethorphan-guaiFENesin  mg  Take 1 tablet by mouth 2 (two) times daily. for 5 days            CONTINUE taking these medications      albuterol 90 mcg/actuation inhaler  Commonly known as: PROVENTIL/VENTOLIN HFA  Inhale 2 puffs into the lungs every 6 (six) hours as needed for Wheezing or Shortness of Breath. Rescue     atorvastatin 40 MG tablet  Commonly known as: LIPITOR     baclofen 10 MG tablet  Commonly known as: LIORESAL  Take 1 tablet (10 mg total) by mouth 4 (four) times daily. for 5 days     clopidogreL 75 mg tablet  Commonly known as: PLAVIX     diazePAM 2 MG tablet  Commonly known as: VALIUM  Take 1 tablet  (2 mg total) by mouth every 6 (six) hours as needed (muscle spasm).     levoFLOXacin 250 MG tablet  Commonly known as: LEVAQUIN  Take 1 tablet (250 mg total) by mouth once daily. for 3 days     levothyroxine 50 MCG tablet  Commonly known as: SYNTHROID     methylPREDNISolone 4 mg tablet  Commonly known as: MEDROL DOSEPACK  use as directed     nitroGLYCERIN 0.4 MG SL tablet  Commonly known as: NITROSTAT     tiZANidine 4 MG tablet  Commonly known as: ZANAFLEX     valsartan 80 MG tablet  Commonly known as: DIOVAN     vitamin D 1000 units Tab  Commonly known as: VITAMIN D3            STOP taking these medications      estradioL 0.01 % (0.1 mg/gram) vaginal cream  Commonly known as: ESTRACE               Where to Get Your Medications        These medications were sent to [facility] [address] [place] [address]      Hours: Mon-Fri, 8a-5:30p Phone: [phone]   albuterol 90 mcg/actuation inhaler  benzonatate 100 MG capsule  isosorbide mononitrate 30 MG 24 hr tablet  levoFLOXacin 250 MG tablet  methylPREDNISolone 4 mg tablet  MUCINEX DM  mg per 12 hr tablet          Indwelling Lines/Drains at time of discharge:   Lines/Drains/Airways       None                   Time spent on the discharge of patient: 92 minutes         Evelyn Enrique MD  Department of Hospital Medicine  [facility] - Telemetry ([facility])

## 2025-04-04 NOTE — PROGRESS NOTES
CM spoke with patient's daughter regarding therapy's recommendation for home health at the time of d/c. Family agreeable and would [facility]. Referral sent; pending acceptance.     Family also asked if patient could get a rolling walker. Order placed and [facility] notified; pending approval and delivery.

## 2025-04-04 NOTE — PT/OT/SLP EVAL
Physical Therapy Evaluation    Patient Name:  Lali Floyd   MRN:  [id number]    Recommendations:     Discharge Recommendations: Low Intensity Therapy   Discharge Equipment Recommendations: walker, rolling   Barriers to discharge: None    Assessment:     Lali Floyd is a 89 y.o. female admitted with a medical diagnosis of NSTEMI (non-ST elevated myocardial infarction).  She presents with the following impairments/functional limitations: weakness, impaired endurance, decreased safety awareness, decreased lower extremity function, decreased coordination, impaired cognition which limits mobility and increases risk of falls. Pt was able to tolerate gait activity with RW but increased fatigue noted. Pt will benefit from continued PT services at low intensity and use of RW.    Rehab Prognosis: Good; patient would benefit from acute skilled PT services to address these deficits and reach maximum level of function.    Recent Surgery: Procedure(s) (LRB):  Left heart cath (Left) 1 Day Post-Op    Plan:     During this hospitalization, patient to be seen 3 x/week to address the identified rehab impairments via gait training, therapeutic activities, therapeutic exercises, neuromuscular re-education and progress toward the following goals:    Plan of Care Expires:  04/18/25    Subjective     Chief Complaint: fatigue  Patient/Family Comments/goals: to get better  Pain/Comfort:  Pain Rating 1: 0/10  Pain Rating Post-Intervention 1: 0/10    Patients cultural, spiritual, Christianity conflicts given the current situation: no    Living Environment:  Pt lives in a MIL suite at her granddaughter's home  Prior to admission, patients level of function was independent with ADLs, ambulatory in home and community. Pt and family reported that pt often holds on to things/people for stability.  Equipment used at home: bath bench.  DME owned (not currently used): none.  Upon discharge, patient will have assistance from  family.    Objective:     Communicated with nursing (Coco) and performed chart review via epic system prior to session.  Patient found supine with peripheral IV, telemetry  upon PT entry to room.    General Precautions: Standard, fall  Orthopedic Precautions:N/A   Braces: N/A  Respiratory Status: Room air    Exams:  Cognitive Exam:  Patient is oriented to Person, Place, Time, and Situation  Gross Motor Coordination:  WFL  Postural Exam:  Patient presented with the following abnormalities:    -       Rounded shoulders  -       Forward head  RLE ROM: WFL  RLE Strength: WFL  LLE ROM: WFL  LLE Strength: WFL    Functional Mobility:  Bed Mobility:     Scooting: contact guard assistance  Supine to Sit: contact guard assistance  Transfers:     Sit to Stand:  minimum assistance with rolling walker  Bed to Chair: contact guard assistance with  rolling walker  using  Stand Pivot  Gait: 30 ft x 2 CGA with RW, demonstrated slow pace, guarded posture, discontinuous steps with  decreased stride length and foot clearance bilaterally. Pt required standing rest break to tolerate gait activity  Balance: fair dynamic standing balance      AM-PAC 6 CLICK MOBILITY  Total Score:16       Treatment & Education:  Educated pt on benefits of consistent participation in PT services to meet functional goals. Educated pt on seated therex to promote strength, circulation and joint mobility. Exercises included AP, LAQ, marching x 10 reps. Educated to perform exercises intermittently throughout day to tolerance. Educated pt on importance of sitting OOB to promote endurance and overall activity tolerance. Educated pt on call don't fall policy and use of call button to alert nursing staff of needs (including to assist in/out of bed). Pt expressed understanding.     Patient left up in chair with all lines intact, call button in reach, chair alarm on, nursing notified, and family present.    GOALS:   Multidisciplinary Problems       Physical Therapy  Goals          Problem: Physical Therapy    Goal Priority Disciplines Outcome Interventions   Physical Therapy Goal     PT, PT/OT     Description: Pt will perform bed mobility independently in order to participate in EOB activity.  Pt will perform transfers independently in order to participate in OOB activity.  Pt will ambulate 250 ft mod I with LRAD in order to participate in daily tasks.   Pt will tolerate sitting OOB in chair x 2-4 hrs in order to participate in daily tasks.                        DME Justifications:   Lali Floyd's mobility limitation cannot be sufficiently resolved by the use of a cane. Her functional mobility deficit can be sufficiently resolved with the use of a Rolling Walker. Patient's mobility limitation significantly impairs their ability to participate in one of more activities of daily living.  The use of a RW will significantly improve the patient's ability to participate in MRADLS and the patient will use it on regular basis in the home.    History:     Past Medical History:   Diagnosis Date    Arthritis     Cancer     Coronary artery disease     Depression     Diabetes mellitus     History of heart artery stent     Hypertension     MI (myocardial infarction)     Renal disorder     Thyroid disease        Past Surgical History:   Procedure Laterality Date    APPENDECTOMY       SECTION      CHOLECYSTECTOMY      HYSTERECTOMY         Time Tracking:     PT Received On: 25  PT Start Time: 1050     PT Stop Time: 1118  PT Total Time (min): 28 min     Billable Minutes: Evaluation 10 and Gait Training 16      2025

## 2025-04-04 NOTE — SUBJECTIVE & OBJECTIVE
Review of Systems   Constitutional: Positive for malaise/fatigue.   HENT: Negative.     Eyes: Negative.    Cardiovascular: Negative.    Respiratory: Negative.     Endocrine: Negative.    Hematologic/Lymphatic: Negative.    Skin: Negative.    Musculoskeletal: Negative.    Gastrointestinal: Negative.    Genitourinary: Negative.    Neurological:  Positive for weakness.   Psychiatric/Behavioral: Negative.     Allergic/Immunologic: Negative.      Objective:     Vital Signs (Most Recent):  Temp: 97.6 °F (36.4 °C) (04/04/25 1136)  Pulse: 69 (04/04/25 1205)  Resp: 18 (04/04/25 1205)  BP: (!) 101/52 (04/04/25 1136)  SpO2: 100 % (04/04/25 1205) Vital Signs (24h Range):  Temp:  [97.6 °F (36.4 °C)-98.5 °F (36.9 °C)] 97.6 °F (36.4 °C)  Pulse:  [46-96] 69  Resp:  [16-18] 18  SpO2:  [95 %-100 %] 100 %  BP: (101-165)/(52-83) 101/52     Weight: 71.2 kg (156 lb 15.5 oz)  Body mass index is 30.66 kg/m².     SpO2: 100 %         Intake/Output Summary (Last 24 hours) at 4/4/2025 1408  Last data filed at 4/4/2025 1225  Gross per 24 hour   Intake 543.58 ml   Output --   Net 543.58 ml       Lines/Drains/Airways       Peripheral Intravenous Line  Duration                  Peripheral IV - Single Lumen 04/02/25 1810 20 G Anterior;Left;Proximal Forearm 1 day                       Physical Exam  Vitals and nursing note reviewed.   Constitutional:       General: She is not in acute distress.     Appearance: Normal appearance. She is well-developed. She is not diaphoretic.   HENT:      Head: Normocephalic and atraumatic.   Eyes:      General:         Right eye: No discharge.         Left eye: No discharge.      Pupils: Pupils are equal, round, and reactive to light.   Cardiovascular:      Rate and Rhythm: Normal rate and regular rhythm.      Heart sounds: Normal heart sounds, S1 normal and S2 normal. No murmur heard.  Pulmonary:      Effort: Pulmonary effort is normal. No respiratory distress.      Breath sounds: Normal breath sounds.  "  Musculoskeletal:      Right lower leg: No edema.      Left lower leg: No edema.   Skin:     General: Skin is warm and dry.      Findings: No erythema.      Comments: R groin access site C/D/I; no bleeding erythema or drainage    Intact pulse   Neurological:      Mental Status: She is alert and oriented to person, place, and time.   Psychiatric:         Mood and Affect: Mood normal.         Behavior: Behavior normal.         Thought Content: Thought content normal.            Significant Labs: CMP   Recent Labs   Lab 04/02/25  1516 04/03/25 0442 04/04/25 0425    139 138   K 4.5 4.0 4.2    111* 111*   CO2 21* 21* 23   BUN 46* 48* 48*   CREATININE 1.6* 1.5* 1.8*   CALCIUM 10.7* 9.6 9.7   ALBUMIN 3.9  --   --    BILITOT 0.4  --   --    ALKPHOS 143  --   --    AST 20  --   --    ALT 20  --   --    ANIONGAP 10 7* 4*   , CBC   Recent Labs   Lab 04/02/25  1516 04/03/25 0442 04/04/25 0425   WBC 8.83 5.35 5.55   HGB 11.1* 10.3* 10.5*   HCT 34.3* 31.5* 32.8*    246 248   , Troponin No results for input(s): "TROPONINIHS" in the last 48 hours., and All pertinent lab results from the last 24 hours have been reviewed.    Significant Imaging: Echocardiogram: Transthoracic echo (TTE) complete ([facility] Only):   Results for orders placed or performed during the hospital encounter of 04/02/25   Echo   Result Value Ref Range    LVOT stroke volume 49.9 cm3    LVIDd 3.9 3.5 - 6.0 cm    LV Systolic Volume 26 mL    LV Systolic Volume Index 15.7 mL/m2    LVIDs 2.7 2.1 - 4.0 cm    LV Diastolic Volume 67 mL    LV Diastolic Volume Index 40.36 mL/m2    Left Ventricular End Systolic Volume by Teichholz Method 25.87 mL    Left Ventricular End Diastolic Volume by Teichholz Method 66.92 mL    IVS 1.1 0.6 - 1.1 cm    LVOT diameter 1.8 cm    LVOT area 2.5 cm2    FS 30.8 28 - 44 %    Left Ventricle Relative Wall Thickness 0.56 cm    PW 1.1 0.6 - 1.1 cm    LV mass 140.1 g    LV Mass Index 84.4 g/m2    MV Peak E Dante 0.83 m/s    TDI " LATERAL 0.10 m/s    TDI SEPTAL 0.06 m/s    E/E' ratio 10 m/s    MV Peak A Dante 0.94 m/s    TR Max Dante 2.0 m/s    E/A ratio 0.88     IVRT 60 msec    E wave deceleration time 191 msec    LV SEPTAL E/E' RATIO 13.8 m/s    LV LATERAL E/E' RATIO 8.3 m/s    LVOT peak dante 0.8 m/s    Left Ventricular Outflow Tract Mean Velocity 0.58 cm/s    Left Ventricular Outflow Tract Mean Gradient 1.44 mmHg    RVOT peak VTI 15.5 cm    TAPSE 1.88 cm    LA size 3.3 cm    Left Atrium Minor Axis 4.4 cm    Left Atrium Major Axis 4.5 cm    RA Major Axis 3.94 cm    AV mean gradient 4 mmHg    AV peak gradient 8 mmHg    Ao peak dante 1.4 m/s    Ao VTI 29.9 cm    LVOT peak VTI 19.6 cm    AV valve area 1.7 cm²    AV Velocity Ratio 0.57     AV index (prosthetic) 0.66     LESLEY by Velocity Ratio 1.5 cm²    Mr max dante 4.42 m/s    MV stenosis pressure 1/2 time 55.49 ms    MV valve area p 1/2 method 3.96 cm2    Triscuspid Valve Regurgitation Peak Gradient 16 mmHg    PV mean gradient 1 mmHg    RVOT peak dante 0.65 m/s    Ao root annulus 2.66 cm    STJ 2.70 cm    Ascending aorta 2.58 cm    IVC diameter 1.97 cm    Mean e' 0.08 m/s    ZLVIDS -0.51     ZLVIDD -1.76     MARYELLEN 25 mL/m2    LA Vol 41 cm3    LA WIDTH 3.3 cm    RA Width 2.6 cm    TV resting pulmonary artery pressure 19 mmHg    RV TB RVSP 5 mmHg    Est. RA pres 3 mmHg    Narrative      Left Ventricle: The left ventricle is normal in size. Mildly increased   wall thickness. There is mild concentric hypertrophy. There is normal   systolic function with a visually estimated ejection fraction of 60 - 65%.   Grade I diastolic dysfunction.    Right Ventricle: The right ventricle is normal in size. Wall thickness   is normal. Systolic function is normal.    Aortic Valve: There is mild aortic valve sclerosis. Mildly calcified   cusps.    Mitral Valve: No leaflet calcification. There is moderate mitral   annular calcification. There is mild regurgitation.    Tricuspid Valve: There is mild regurgitation.     Pulmonary Artery: The estimated pulmonary artery systolic pressure is   19 mmHg.    IVC/SVC: Normal venous pressure at 3 mmHg.      and EKG: Reviewed

## 2025-04-04 NOTE — CONSULTS
Food & Nutrition Education    Diet Education: Cardiac, Diabetic diet    Learners: Patient    Nutrition Education provided with handouts:  Heart Healthy Consistent Carbohydrate Nutrition Therapy  (nutritioncaremanual.org)    Comments:  PMH: NSTEMI (non-ST elevated myocardial infarction), Acute cystitis, Dyslipidemia due to type 2 diabetes mellitus, Essential HTN, Hypothyroidism, CARLOS    89 y.o. Female admitted for NSTEMI (non-ST elevated myocardial infarction). Pt is currently on a Heart healthy diet. RD intern visited pt at bedside. Pt reported 25% PO intake d/t exhaustion and poor appetite. Pt reported # stable for previous year. Per chart 4/4/25, 156#, BMI 30.66 (Obese), 3/29/25, 145#, +9# not significant for malnutrition, recommend reweigh for accuracy. RD intern provided a menu to help encourage pt preferred food choices, RD added Suplena BID to pt's orders and trays.     RD educated patient on low sodium, general healthful diet r/t recent hospital diagnosis. Recommended a well-balanced diet with a variety of fresh foods, fruits and vegetables (5 cups/day), whole grains (3 oz/day), and fat-free or low-fat dairy. Discussed reading food packages, food labels, and nutrition facts labels to identify nutrient content of foods.    Discussed the importance of limiting sodium to less than 2,000 mg per day. Recommended salt free seasonings and other herbs and spices in meals to enhance flavor without additional sodium.    Discussed dietary sources of cholesterol, the importance of incorporating healthy fats into the diet, and avoiding saturated and trans fats for heart health. For a generally healthy diet, aim for total fat less than 25-35% of calories.    Discussed the importance of fiber (especially soluble fiber), dietary sources, and a goal intake of >20-30g/day.    Discussed the importance of carbohydrates in the diet, but with diabetes, focusing on consistent carb intake throughout the day with emphasis on  protein and fiber.    Pt and visitors reported pt typically consumes restaurant meals and food prepared by her daughter in law 3 meals daily, small breakfast of oatmeal with meds and large lunch and dinner. Pt reports snacking often on ultra processed foods, chips, cookies, crackers and some mixed nuts. Pt reports previously consuming large amounts of sugary beverages but that she has now replaced with water and consumes 2 cups of coffee or tea daily. Pt is well educated but has a low level of knowledge and motivation toward nutrition and dietary change. RD intern advised pt to swap red meat, fried foods and butter for poultry, seafood, fish, beans, non fried foods and cooking with oil. Pt and pt visitors expressed gratitude for the education and reported they would try to reduce dinning out meals and red meat while increasing fruit and vegetable consumption and try to adopt a healthier plate of food using the MyPlate system. RD intern advised pt seek further education with outpatient RD and diabetic educator and to use RD contact information with any further questions/concerns that she may have.       Nutrition Related Social Determinants of Health: SDOH: None Identified    NFPE performed, no signs of malnutrition noted.   All questions and concerns answered.  Provided handout with dietitian's contact information.  *Please re-consult as needed.  Thank you,  Jarad Maldonado, RD intern  Kristel Hartley, IRENE, RDN, LDN

## 2025-04-04 NOTE — PLAN OF CARE
EVAL AND TX COMPLETED: facilitated bed mobility with CGA, transfers with min A. Ambulated 30 ft x 2 CGA with RW, increased fatigue noted throughout session and required standing rest break. Recommend continued PT services at low intensity.

## 2025-04-04 NOTE — PT/OT/SLP EVAL
Occupational Therapy Evaluation and Treatment    Name: Lali Floyd  MRN: [id number]  Admitting Diagnosis: NSTEMI (non-ST elevated myocardial infarction)  Recent Surgery: Procedure(s) (LRB):  Left heart cath (Left) 1 Day Post-Op    Recommendations:     Discharge Recommendations: Low Intensity Therapy  Level of Assistance Recommended: 24 hours supervision  Discharge Equipment Recommendations: walker, rolling  Barriers to discharge:      Assessment:     Lali Floyd is a 89 y.o. female with a medical diagnosis of NSTEMI (non-ST elevated myocardial infarction). She presents with performance deficits affecting function including weakness, impaired self care skills, impaired balance, decreased safety awareness, impaired endurance, impaired functional mobility, decreased upper extremity function, gait instability.     Rehab Prognosis: Good; patient would benefit from acute OT services to address these deficits and reach maximum level of function.    Plan:     Patient to be seen 2 x/week to address the above listed problems via self-care/home management, therapeutic exercises, therapeutic activities  Plan of Care Expires: 04/18/25  Plan of Care Reviewed with: patient    Subjective     Chief Complaint: DEBILITY AND GENERALIZED WEAKNESS  Patient Comments/Goals: GET STRONGER  Pain/Comfort:  Pain Rating 1: 0/10    Patients cultural, spiritual, Sikh conflicts given the current situation:      Social History:  Living Environment: Patient lives alone in a single story home with number of outside stair(s): 0  Prior Level of Function: Prior to admission, patient was modified independent  Roles and Routines: Patient was driving and not working prior to admission.  Equipment Used at Home: bath bench  Assistance Upon Discharge: family    Objective:     Communicated with NURSE AND EPIC CHART REVIEW prior to session. Patient found HOB elevated with peripheral IV, telemetry upon OT entry to room.    General Precautions:  Standard, fall   Orthopedic Precautions: N/A   Braces: N/A    Respiratory Status: Room air    Occupational Performance    Gait belt applied - Yes    Bed Mobility:   Rolling/Turning to Left with contact guard assistance  Scooting anteriorly to EOB to have both feet planted on floor: contact guard assistance  Supine to sit from left side of bed with contact guard assistance    Functional Mobility/Transfers:  Sit <> Stand Transfer with minimum assistance with rolling walker  Bed <> Chair Transfer using Step Transfer technique with minimum assistance with rolling walker  Functional Mobility: CGA X 30 FEET X 2 WITH  ROLLING WALKER    Activities of Daily Living:  Upper Body Dressing: minimum assistance  Lower Body Dressing: minimum assistance  Toileting: maximal assistance [employer]      Cognitive/Visual Perceptual:  Cognitive/Psychosocial Skills:    -     Oriented to: Person, Place, Time, Situation  -     Follows Commands/attention: Follows multistep  commands  -     Communication: clear/fluent  -     Memory: No Deficits noted  -     Safety awareness/insight to disability: impaired    Physical Exam:  Upper Extremity Range of Motion:     -       Right Upper Extremity: WFL  -       Left Upper Extremity: WFL  Upper Extremity Strength:    -       Right Upper Extremity: MMT: 3/5 GROSSLY  -       Left Upper Extremity: MMT: 3/5 GROSSLY   Strength:    -       Right Upper Extremity: MMT: 3/5 GROSSLY  -       Left Upper Extremity: MMT: 3/5 grossly    AMPAC 6 Click ADL:  AMPAC Total Score: 20    Treatment & Education:  Educated patient on importance of increased tolerance to upright position and direct impact on CV endurance and strength. Patient encouraged to sit up in chair/ EOB, for a minimum of 2 consecutive hours including for all meals.. Encouraged patient to perform AROM TE to BUE throughout the day within all available planes of motion. Re enforced importance of utilizing call light to meet needs in room and not  attempt to get up without staff assistance. Patient verbalized understanding and agreed to comply.           Patient clear to stand pivot transfer with RN/PCT, assist xstep <pivot transfer with min a  with rolling walker .    Patient left up in chair with all lines intact, call button in reach, RN notified, chair alarm on, and family present.    GOALS:   Multidisciplinary Problems       Occupational Therapy Goals          Problem: Occupational Therapy    Goal Priority Disciplines Outcome Interventions   Occupational Therapy Goal     OT, PT/OT     Description: O.T. GOALS TO BE MET BY 25  PT WILL TOLERATE 1 SET X 15 REPS B UE ROM EXERCISE  MOD (I) WITH LE DRESSING  MOD (I) WITH TOILET TRANSFER  MOD (I) WITH TOILETING                         DME Justifications:   Lali's mobility limitation cannot be sufficiently resolved by the use of a cane. Her functional mobility deficit can be sufficiently resolved with the use of a Rolling Walker. Patient's mobility limitation significantly impairs their ability to participate in one of more activities of daily living.  The use of a RW will significantly improve the patient's ability to participate in MRADLS and the patient will use it on regular basis in the home.    History:     Past Medical History:   Diagnosis Date    Arthritis     Cancer     Coronary artery disease     Depression     Diabetes mellitus     History of heart artery stent     Hypertension     MI (myocardial infarction)     Renal disorder     Thyroid disease          Past Surgical History:   Procedure Laterality Date    APPENDECTOMY       SECTION      CHOLECYSTECTOMY      HYSTERECTOMY         Time Tracking:     OT Date of Treatment: 25  OT Start Time: 105  OT Stop Time: 1122  OT Total Time (min): 30 min    Billable Minutes: Evaluation 15 minutes and Therapeutic Activity 15 minutes    2025

## 2025-04-04 NOTE — PROGRESS NOTES
[facility] - Telemetry ([facility])  Cardiology  Progress Note    Patient Name: Lali Floyd  MRN: [id number]  Admission Date: 4/2/2025  Hospital Length of Stay: 1 days  Code Status: Full Code   Attending Physician: Evelyn Enrique,*   Primary Care Physician: Blayne Santos MD  Expected Discharge Date: 4/4/2025  Principal Problem:NSTEMI (non-ST elevated myocardial infarction)    Subjective:   HPI:  Ms. Floyd is an 89 year old female patient whose current medical conditions include CAD s/p prior LAD stent in 2007, NIDDM, CKD, HTN, and dyslipidemia who presented to [facility] ED yesterday evening due to substernal chest tightness/pressure that onset around 8 AM. Associated symptoms included fatigue/weakness. Patient denied any associated SOB, fever, chills, palpitations, near syncope, or syncope. Reported she had been diagnosed with a URI a week ago but seemed to have mostly recovered. Initial workup in ED revealed creatinine of 1.6, troponin of 0.244>0.271, +UTI. Patient subsequently admitted for further evaluation and treatment. Cardiology consulted to assist with management. Patient seen and examined today, resting in bed. Feels ok. States chest pain has resolved. Still feels fatigued. Typically does not have CP symptoms. Very active for her age, lives at home alone and still drives. She reports compliance with her medications. Followed on OP basis by outside cardiologist, Dr. Dempsey. Prior treadmill stress test in [facility] last year negative however exercise capacity was reduced. TTE pending. Troponin 0.244>0.271>0.302>0.298>0.260. EKG reviewed, sinus bradycardia. [facility] planned today by Dr. Quiroz.     Hospital Course:   4/4/2025-Patient seen and examined today, s/p C yesterday which showed patent LAD stent, non-obs CAD. Feels ok. No recurrent CP. Reports mild cough. Fatigued. Labs reviewed. Creatinine 1.8. Imdur added to med regimen. Can f/u with primary cardiologist.         Review of Systems    Constitutional: Positive for malaise/fatigue.   HENT: Negative.     Eyes: Negative.    Cardiovascular: Negative.    Respiratory: Negative.     Endocrine: Negative.    Hematologic/Lymphatic: Negative.    Skin: Negative.    Musculoskeletal: Negative.    Gastrointestinal: Negative.    Genitourinary: Negative.    Neurological:  Positive for weakness.   Psychiatric/Behavioral: Negative.     Allergic/Immunologic: Negative.      Objective:     Vital Signs (Most Recent):  Temp: 97.6 °F (36.4 °C) (04/04/25 1136)  Pulse: 69 (04/04/25 1205)  Resp: 18 (04/04/25 1205)  BP: (!) 101/52 (04/04/25 1136)  SpO2: 100 % (04/04/25 1205) Vital Signs (24h Range):  Temp:  [97.6 °F (36.4 °C)-98.5 °F (36.9 °C)] 97.6 °F (36.4 °C)  Pulse:  [46-96] 69  Resp:  [16-18] 18  SpO2:  [95 %-100 %] 100 %  BP: (101-165)/(52-83) 101/52     Weight: 71.2 kg (156 lb 15.5 oz)  Body mass index is 30.66 kg/m².     SpO2: 100 %         Intake/Output Summary (Last 24 hours) at 4/4/2025 1408  Last data filed at 4/4/2025 1225  Gross per 24 hour   Intake 543.58 ml   Output --   Net 543.58 ml       Lines/Drains/Airways       Peripheral Intravenous Line  Duration                  Peripheral IV - Single Lumen 04/02/25 1810 20 G Anterior;Left;Proximal Forearm 1 day                       Physical Exam  Vitals and nursing note reviewed.   Constitutional:       General: She is not in acute distress.     Appearance: Normal appearance. She is well-developed. She is not diaphoretic.   HENT:      Head: Normocephalic and atraumatic.   Eyes:      General:         Right eye: No discharge.         Left eye: No discharge.      Pupils: Pupils are equal, round, and reactive to light.   Cardiovascular:      Rate and Rhythm: Normal rate and regular rhythm.      Heart sounds: Normal heart sounds, S1 normal and S2 normal. No murmur heard.  Pulmonary:      Effort: Pulmonary effort is normal. No respiratory distress.      Breath sounds: Normal breath sounds.   Musculoskeletal:      Right  "lower leg: No edema.      Left lower leg: No edema.   Skin:     General: Skin is warm and dry.      Findings: No erythema.      Comments: R groin access site C/D/I; no bleeding erythema or drainage    Intact pulse   Neurological:      Mental Status: She is alert and oriented to person, place, and time.   Psychiatric:         Mood and Affect: Mood normal.         Behavior: Behavior normal.         Thought Content: Thought content normal.            Significant Labs: CMP   Recent Labs   Lab 04/02/25  1516 04/03/25 0442 04/04/25  0425    139 138   K 4.5 4.0 4.2    111* 111*   CO2 21* 21* 23   BUN 46* 48* 48*   CREATININE 1.6* 1.5* 1.8*   CALCIUM 10.7* 9.6 9.7   ALBUMIN 3.9  --   --    BILITOT 0.4  --   --    ALKPHOS 143  --   --    AST 20  --   --    ALT 20  --   --    ANIONGAP 10 7* 4*   , CBC   Recent Labs   Lab 04/02/25  1516 04/03/25 0442 04/04/25 0425   WBC 8.83 5.35 5.55   HGB 11.1* 10.3* 10.5*   HCT 34.3* 31.5* 32.8*    246 248   , Troponin No results for input(s): "TROPONINIHS" in the last 48 hours., and All pertinent lab results from the last 24 hours have been reviewed.    Significant Imaging: Echocardiogram: Transthoracic echo (TTE) complete ([facility] Only):   Results for orders placed or performed during the hospital encounter of 04/02/25   Echo   Result Value Ref Range    LVOT stroke volume 49.9 cm3    LVIDd 3.9 3.5 - 6.0 cm    LV Systolic Volume 26 mL    LV Systolic Volume Index 15.7 mL/m2    LVIDs 2.7 2.1 - 4.0 cm    LV Diastolic Volume 67 mL    LV Diastolic Volume Index 40.36 mL/m2    Left Ventricular End Systolic Volume by Teichholz Method 25.87 mL    Left Ventricular End Diastolic Volume by Teichholz Method 66.92 mL    IVS 1.1 0.6 - 1.1 cm    LVOT diameter 1.8 cm    LVOT area 2.5 cm2    FS 30.8 28 - 44 %    Left Ventricle Relative Wall Thickness 0.56 cm    PW 1.1 0.6 - 1.1 cm    LV mass 140.1 g    LV Mass Index 84.4 g/m2    MV Peak E Dante 0.83 m/s    TDI LATERAL 0.10 m/s    TDI " SEPTAL 0.06 m/s    E/E' ratio 10 m/s    MV Peak A Dante 0.94 m/s    TR Max Dante 2.0 m/s    E/A ratio 0.88     IVRT 60 msec    E wave deceleration time 191 msec    LV SEPTAL E/E' RATIO 13.8 m/s    LV LATERAL E/E' RATIO 8.3 m/s    LVOT peak dante 0.8 m/s    Left Ventricular Outflow Tract Mean Velocity 0.58 cm/s    Left Ventricular Outflow Tract Mean Gradient 1.44 mmHg    RVOT peak VTI 15.5 cm    TAPSE 1.88 cm    LA size 3.3 cm    Left Atrium Minor Axis 4.4 cm    Left Atrium Major Axis 4.5 cm    RA Major Axis 3.94 cm    AV mean gradient 4 mmHg    AV peak gradient 8 mmHg    Ao peak dante 1.4 m/s    Ao VTI 29.9 cm    LVOT peak VTI 19.6 cm    AV valve area 1.7 cm²    AV Velocity Ratio 0.57     AV index (prosthetic) 0.66     LESLEY by Velocity Ratio 1.5 cm²    Mr max dante 4.42 m/s    MV stenosis pressure 1/2 time 55.49 ms    MV valve area p 1/2 method 3.96 cm2    Triscuspid Valve Regurgitation Peak Gradient 16 mmHg    PV mean gradient 1 mmHg    RVOT peak dante 0.65 m/s    Ao root annulus 2.66 cm    STJ 2.70 cm    Ascending aorta 2.58 cm    IVC diameter 1.97 cm    Mean e' 0.08 m/s    ZLVIDS -0.51     ZLVIDD -1.76     MARYELLEN 25 mL/m2    LA Vol 41 cm3    LA WIDTH 3.3 cm    RA Width 2.6 cm    TV resting pulmonary artery pressure 19 mmHg    RV TB RVSP 5 mmHg    Est. RA pres 3 mmHg    Narrative      Left Ventricle: The left ventricle is normal in size. Mildly increased   wall thickness. There is mild concentric hypertrophy. There is normal   systolic function with a visually estimated ejection fraction of 60 - 65%.   Grade I diastolic dysfunction.    Right Ventricle: The right ventricle is normal in size. Wall thickness   is normal. Systolic function is normal.    Aortic Valve: There is mild aortic valve sclerosis. Mildly calcified   cusps.    Mitral Valve: No leaflet calcification. There is moderate mitral   annular calcification. There is mild regurgitation.    Tricuspid Valve: There is mild regurgitation.    Pulmonary Artery: The estimated  pulmonary artery systolic pressure is   19 mmHg.    IVC/SVC: Normal venous pressure at 3 mmHg.      and EKG: Reviewed  Assessment and Plan:   Patient who presents with CP/NSTEMI, LHC showed patent stent and non-obs CAD elsewhere. Continue OMT.     * NSTEMI (non-ST elevated myocardial infarction)  -Patient with history of CAD s/p remote LAD stenting who presents with substernal chest tightness/pressure--->NSTEMI  -Troponin 0.244>0.271>0.302>0.298>0.260  -Continue Plavix, ARB, statin  -No BB given bradycardia  -Continue heparin gtt  -TTE pending  -[facility] today by Dr. Quiroz    4/4/2025  -[facility] yesterday with patent LAD stent, non-obs CAD  --Resume Plavix  -Continue ARB, statin  -Imdur added  -TTE with normal EF    CARLOS (acute kidney injury)  -Baseline CKD, creatinine 1.5 this AM  -Will hydrate prior to cath    Essential hypertension  -Titrate medications    Dyslipidemia due to type 2 diabetes mellitus  -Statin    Acute cystitis  -On abx        VTE Risk Mitigation (From admission, onward)           Ordered     IP VTE HIGH RISK PATIENT  Once         04/02/25 2031     Place sequential compression device  Until discontinued         04/02/25 2031                    Laura Davila PA-C  Cardiology  [facility] - Telemetry ([facility])

## 2025-04-04 NOTE — PROGRESS NOTES
Pharmacist Renal Dose Adjustment Note    Lali Floyd is a 89 y.o. female being treated with the medication levaquin    Patient Data:    Vital Signs (Most Recent):  Temp: 97.6 °F (36.4 °C) (04/04/25 0748)  Pulse: 61 (04/04/25 0748)  Resp: 16 (04/04/25 0748)  BP: 133/83 (04/04/25 0748)  SpO2: 97 % (04/04/25 0748) Vital Signs (72h Range):  Temp:  [97.4 °F (36.3 °C)-98.6 °F (37 °C)]   Pulse:  [43-62]   Resp:  [16-22]   BP: (116-185)/()   SpO2:  [95 %-100 %]      Recent Labs   Lab 04/02/25  1516 04/03/25  0442 04/04/25  0425   CREATININE 1.6* 1.5* 1.8*     Serum creatinine: 1.8 mg/dL (H) 04/04/25 0425  Estimated creatinine clearance: 19.4 mL/min (A)    Medication:levaquin 250mg daily will be changed to 500mg every 48 hours for crcl <20ml/min    Pharmacist's Name: Tisha Martin  Pharmacist's Extension: [phone]

## 2025-04-10 LAB
OHS QRS DURATION: 124 MS
OHS QTC CALCULATION: 466 MS

## 2025-06-03 ENCOUNTER — EXTERNAL HOME HEALTH (OUTPATIENT)
Dept: HOME HEALTH SERVICES | Facility: HOSPITAL | Age: 89
End: 2025-06-03
Payer: MEDICARE

## (undated) DEVICE — CATH INFINITI MULTIPAK JR4 5FR

## (undated) DEVICE — CATH JR4 5FR

## (undated) DEVICE — ANGIOTOUCH KIT

## (undated) DEVICE — OMNIPAQUE 300MG 150ML VIAL

## (undated) DEVICE — DRAPE ANGIO BRACH 38X44IN

## (undated) DEVICE — WIRE GUIDE TEFLON 3CM .035 145

## (undated) DEVICE — BAND TR COMP DEVICE REG 24CM

## (undated) DEVICE — CATH JL4 5FR

## (undated) DEVICE — CATH PIG145 INFINITI 5X110CM

## (undated) DEVICE — KIT SYR REUSABLE

## (undated) DEVICE — PACK HEART CATH BR

## (undated) DEVICE — SHEATH INTRODUCER 5FR 10CM